# Patient Record
Sex: FEMALE | Race: WHITE | NOT HISPANIC OR LATINO | Employment: UNEMPLOYED | URBAN - NONMETROPOLITAN AREA
[De-identification: names, ages, dates, MRNs, and addresses within clinical notes are randomized per-mention and may not be internally consistent; named-entity substitution may affect disease eponyms.]

---

## 2018-01-29 ENCOUNTER — TRANSCRIBE ORDERS (OUTPATIENT)
Dept: ADMINISTRATIVE | Facility: HOSPITAL | Age: 43
End: 2018-01-29

## 2018-01-29 DIAGNOSIS — Z12.31 VISIT FOR SCREENING MAMMOGRAM: Primary | ICD-10-CM

## 2018-02-13 ENCOUNTER — HOSPITAL ENCOUNTER (OUTPATIENT)
Dept: MAMMOGRAPHY | Facility: HOSPITAL | Age: 43
Discharge: HOME | End: 2018-02-13
Admitting: NURSE PRACTITIONER

## 2018-02-13 DIAGNOSIS — Z12.31 VISIT FOR SCREENING MAMMOGRAM: ICD-10-CM

## 2018-02-13 PROCEDURE — 77067 SCR MAMMO BI INCL CAD: CPT

## 2018-02-13 PROCEDURE — 77067 SCR MAMMO BI INCL CAD: CPT | Performed by: RADIOLOGY

## 2018-02-13 PROCEDURE — 77063 BREAST TOMOSYNTHESIS BI: CPT

## 2018-02-13 PROCEDURE — 77063 BREAST TOMOSYNTHESIS BI: CPT | Performed by: RADIOLOGY

## 2019-07-10 ENCOUNTER — HOSPITAL ENCOUNTER (OUTPATIENT)
Dept: MAMMOGRAPHY | Facility: HOSPITAL | Age: 44
Discharge: HOME | End: 2019-07-10
Admitting: NURSE PRACTITIONER

## 2019-07-10 DIAGNOSIS — Z12.39 SCREENING BREAST EXAMINATION: ICD-10-CM

## 2019-07-10 PROCEDURE — 77063 BREAST TOMOSYNTHESIS BI: CPT

## 2019-07-10 PROCEDURE — 77067 SCR MAMMO BI INCL CAD: CPT | Performed by: RADIOLOGY

## 2019-07-10 PROCEDURE — 77063 BREAST TOMOSYNTHESIS BI: CPT | Performed by: RADIOLOGY

## 2019-07-10 PROCEDURE — 77067 SCR MAMMO BI INCL CAD: CPT

## 2019-07-25 ENCOUNTER — HOSPITAL ENCOUNTER (OUTPATIENT)
Dept: ULTRASOUND IMAGING | Facility: HOSPITAL | Age: 44
Discharge: HOME | End: 2019-07-25
Admitting: RADIOLOGY

## 2019-07-25 DIAGNOSIS — R92.8 ABNORMAL MAMMOGRAM OF BOTH BREASTS: ICD-10-CM

## 2019-07-25 PROCEDURE — 76642 ULTRASOUND BREAST LIMITED: CPT

## 2019-07-25 PROCEDURE — 76642 ULTRASOUND BREAST LIMITED: CPT | Performed by: RADIOLOGY

## 2019-08-28 ENCOUNTER — TRANSCRIBE ORDERS (OUTPATIENT)
Dept: ADMINISTRATIVE | Facility: HOSPITAL | Age: 44
End: 2019-08-28

## 2019-08-28 ENCOUNTER — HOSPITAL ENCOUNTER (OUTPATIENT)
Dept: GENERAL RADIOLOGY | Facility: HOSPITAL | Age: 44
Discharge: HOME | End: 2019-08-28
Admitting: NURSE PRACTITIONER

## 2019-08-28 DIAGNOSIS — R52 PAIN: ICD-10-CM

## 2019-08-28 DIAGNOSIS — R52 PAIN: Primary | ICD-10-CM

## 2019-08-28 PROCEDURE — 72110 X-RAY EXAM L-2 SPINE 4/>VWS: CPT

## 2019-08-28 PROCEDURE — 72220 X-RAY EXAM SACRUM TAILBONE: CPT | Performed by: RADIOLOGY

## 2019-08-28 PROCEDURE — 72220 X-RAY EXAM SACRUM TAILBONE: CPT

## 2019-08-28 PROCEDURE — 72110 X-RAY EXAM L-2 SPINE 4/>VWS: CPT | Performed by: RADIOLOGY

## 2020-03-11 ENCOUNTER — HOSPITAL ENCOUNTER (OUTPATIENT)
Dept: MAMMOGRAPHY | Facility: HOSPITAL | Age: 45
Discharge: HOME | End: 2020-03-11
Admitting: RADIOLOGY

## 2020-03-11 DIAGNOSIS — Z09 FOLLOW-UP EXAM, 3-6 MONTHS SINCE PREVIOUS EXAM: ICD-10-CM

## 2020-03-11 PROCEDURE — 77066 DX MAMMO INCL CAD BI: CPT | Performed by: RADIOLOGY

## 2020-03-11 PROCEDURE — G0279 TOMOSYNTHESIS, MAMMO: HCPCS

## 2020-03-11 PROCEDURE — 77066 DX MAMMO INCL CAD BI: CPT

## 2020-03-11 PROCEDURE — 77062 BREAST TOMOSYNTHESIS BI: CPT | Performed by: RADIOLOGY

## 2020-05-06 ENCOUNTER — TRANSCRIBE ORDERS (OUTPATIENT)
Dept: ADMINISTRATIVE | Facility: HOSPITAL | Age: 45
End: 2020-05-06

## 2020-05-06 DIAGNOSIS — R22.1 NECK MASS: Primary | ICD-10-CM

## 2020-05-06 DIAGNOSIS — R13.14 DYSPHAGIA, PHARYNGOESOPHAGEAL PHASE: ICD-10-CM

## 2020-05-19 ENCOUNTER — HOSPITAL ENCOUNTER (OUTPATIENT)
Dept: CT IMAGING | Facility: HOSPITAL | Age: 45
Discharge: HOME | End: 2020-05-19
Admitting: OTOLARYNGOLOGY

## 2020-05-19 DIAGNOSIS — R22.1 NECK MASS: ICD-10-CM

## 2020-05-19 DIAGNOSIS — R13.14 DYSPHAGIA, PHARYNGOESOPHAGEAL PHASE: ICD-10-CM

## 2020-05-19 PROCEDURE — 0 IOVERSOL 68 % SOLUTION: Performed by: OTOLARYNGOLOGY

## 2020-05-19 PROCEDURE — 70491 CT SOFT TISSUE NECK W/DYE: CPT | Performed by: RADIOLOGY

## 2020-05-19 PROCEDURE — 70491 CT SOFT TISSUE NECK W/DYE: CPT

## 2020-05-19 RX ADMIN — IOVERSOL 50 ML: 678 INJECTION INTRA-ARTERIAL; INTRAVENOUS at 09:22

## 2020-06-03 ENCOUNTER — TRANSCRIBE ORDERS (OUTPATIENT)
Dept: ADMINISTRATIVE | Facility: HOSPITAL | Age: 45
End: 2020-06-03

## 2020-06-03 DIAGNOSIS — R13.14 DYSPHAGIA, PHARYNGOESOPHAGEAL PHASE: Primary | ICD-10-CM

## 2020-06-12 ENCOUNTER — CONSULT (OUTPATIENT)
Dept: GASTROENTEROLOGY | Facility: CLINIC | Age: 45
End: 2020-06-12

## 2020-06-12 VITALS
TEMPERATURE: 98.4 F | SYSTOLIC BLOOD PRESSURE: 115 MMHG | HEART RATE: 72 BPM | WEIGHT: 206.4 LBS | DIASTOLIC BLOOD PRESSURE: 79 MMHG | OXYGEN SATURATION: 97 % | HEIGHT: 66 IN | BODY MASS INDEX: 33.17 KG/M2

## 2020-06-12 DIAGNOSIS — R13.19 ESOPHAGEAL DYSPHAGIA: Primary | ICD-10-CM

## 2020-06-12 DIAGNOSIS — R11.2 NAUSEA AND VOMITING, INTRACTABILITY OF VOMITING NOT SPECIFIED, UNSPECIFIED VOMITING TYPE: ICD-10-CM

## 2020-06-12 PROCEDURE — 99243 OFF/OP CNSLTJ NEW/EST LOW 30: CPT | Performed by: PHYSICIAN ASSISTANT

## 2020-06-12 RX ORDER — IBUPROFEN 800 MG/1
800 TABLET ORAL EVERY 6 HOURS PRN
COMMUNITY
End: 2022-09-16

## 2020-06-12 RX ORDER — TOPIRAMATE 25 MG/1
25 TABLET ORAL 2 TIMES DAILY
COMMUNITY
Start: 2020-03-25 | End: 2022-09-16

## 2020-06-12 RX ORDER — CYANOCOBALAMIN 1000 UG/ML
INJECTION, SOLUTION INTRAMUSCULAR; SUBCUTANEOUS
COMMUNITY
Start: 2020-05-08

## 2020-06-12 RX ORDER — GABAPENTIN 800 MG/1
800 TABLET ORAL 3 TIMES DAILY
COMMUNITY
Start: 2020-05-24

## 2020-06-12 RX ORDER — SUMATRIPTAN 100 MG/1
TABLET, FILM COATED ORAL
COMMUNITY
Start: 2020-03-25 | End: 2022-09-16

## 2020-06-12 RX ORDER — TRAZODONE HYDROCHLORIDE 150 MG/1
150 TABLET ORAL NIGHTLY PRN
COMMUNITY
Start: 2020-04-08 | End: 2022-09-16

## 2020-06-12 RX ORDER — ESCITALOPRAM OXALATE 10 MG/1
10 TABLET ORAL EVERY MORNING
Status: ON HOLD | COMMUNITY
Start: 2020-03-31 | End: 2022-09-19

## 2020-06-12 NOTE — PROGRESS NOTES
: 1975    Chief Complaint   Patient presents with   • Difficulty Swallowing       Marisol Overton is a 44 y.o. female who presents to the office today as a consultation from Tyrese Mcneill MD for evaluation of Difficulty Swallowing.    History of Present Illness:  For the past 8 years, she has been having dysphagia with eating. She has noticed it is worse with meats and fruits. She was seeing ENT due to swelling in her neck that turned out to be benign. She has a cough which she relates as well to the dysphagia. She will regurgitate her food when it will not go down. Denies heartburn or acid reflux into her mouth. Does wake up with a sore throat in the mornings at times. Has nausea and vomiting which is mild and intermittent. Appetite is ok, taking Topamax for migraines. Has constipation which is mild and intermittent, usually she has bowel movements daily. Denies any rectal bleeding. Mother had colon polyps. There is no known family history of colon cancer or colon polyps.    Was told in the past that she could have a hiatal hernia.    Review of Systems   Constitutional: Negative for chills, fatigue and fever.   HENT: Positive for trouble swallowing.    Eyes: Negative.    Respiratory: Positive for cough and choking. Negative for chest tightness and shortness of breath.    Cardiovascular: Negative for chest pain.   Gastrointestinal: Positive for constipation. Negative for abdominal distention, abdominal pain, anal bleeding, blood in stool, diarrhea, nausea and vomiting.   Endocrine: Negative.    Genitourinary: Negative for difficulty urinating.   Musculoskeletal: Positive for back pain. Negative for neck pain.   Skin: Negative.    Allergic/Immunologic: Negative for environmental allergies and food allergies.   Neurological: Positive for dizziness, light-headedness and headaches.   Hematological: Negative.    Psychiatric/Behavioral: Negative.      I have reviewed and confirmed the accuracy of the ROS as  "documented by the MA/LPN/RN Ambika Bacon PA-C    Past Medical History:   Diagnosis Date   • Ovarian cancer (CMS/HCC) 1997       Past Surgical History:   Procedure Laterality Date   • APPENDECTOMY     • CHOLECYSTECTOMY         Family History   Problem Relation Age of Onset   • Breast cancer Paternal Grandmother    • Lung cancer Maternal Grandmother        Social History     Socioeconomic History   • Marital status:      Spouse name: Not on file   • Number of children: Not on file   • Years of education: Not on file   • Highest education level: Not on file   Tobacco Use   • Smoking status: Never Smoker   • Smokeless tobacco: Never Used   Substance and Sexual Activity   • Alcohol use: Never     Frequency: Never   • Drug use: Never   • Sexual activity: Defer     Current Outpatient Medications:   •  cyanocobalamin 1000 MCG/ML injection, INJECT 1 ML INTRAMUSCULARLY EVERY TWO WEEKS, Disp: , Rfl:   •  escitalopram (LEXAPRO) 10 MG tablet, Take 10 mg by mouth Every Morning., Disp: , Rfl:   •  gabapentin (NEURONTIN) 800 MG tablet, Take 800 mg by mouth 3 (Three) Times a Day., Disp: , Rfl:   •  ibuprofen (ADVIL,MOTRIN) 800 MG tablet, Take 800 mg by mouth Every 6 (Six) Hours As Needed for Mild Pain ., Disp: , Rfl:   •  SUMAtriptan (IMITREX) 100 MG tablet, TAKE 1 TABLET BY MOUTH ONCE DAILY AS NEEDED FOR MIGRAINE HEADACHE, Disp: , Rfl:   •  topiramate (TOPAMAX) 25 MG tablet, Take 25 mg by mouth 2 (Two) Times a Day., Disp: , Rfl:   •  traZODone (DESYREL) 150 MG tablet, Take 150 mg by mouth At Night As Needed. for sleep, Disp: , Rfl:     Allergies:   Shellfish-derived products    Vitals:  /79 (BP Location: Left arm, Patient Position: Sitting, Cuff Size: Adult)   Pulse 72   Temp 98.4 °F (36.9 °C)   Ht 167.6 cm (66\")   Wt 93.6 kg (206 lb 6.4 oz)   SpO2 97%   BMI 33.31 kg/m²     Physical Exam   Constitutional: She is oriented to person, place, and time. She appears well-developed and well-nourished. No distress. "   HENT:   Head: Normocephalic and atraumatic.   Wearing mask covering mouth only   Eyes: Conjunctivae are normal. Right eye exhibits no discharge. Left eye exhibits no discharge. No scleral icterus.   Neck: Normal range of motion. No JVD present.   Cardiovascular: Normal rate, regular rhythm and normal heart sounds. Exam reveals no gallop and no friction rub.   No murmur heard.  Pulmonary/Chest: Effort normal and breath sounds normal. No respiratory distress. She has no wheezes. She has no rales. She exhibits no tenderness.   Abdominal: Soft. Bowel sounds are normal. She exhibits no mass. There is no tenderness.   Musculoskeletal: Normal range of motion. She exhibits no edema or deformity.   Neurological: She is alert and oriented to person, place, and time. Coordination normal.   Skin: Skin is warm and dry. No rash noted. She is not diaphoretic. No erythema.   Tan skin. Tattoos.   Psychiatric: She has a normal mood and affect. Her behavior is normal. Judgment and thought content normal.   Vitals reviewed.    Assessment:  1. Esophageal dysphagia    2. Nausea and vomiting, intractability of vomiting not specified, unspecified vomiting type      Plan:  Orders Placed This Encounter   Procedures   • Follow Anesthesia Guidelines / Standing Orders   • Obtain Informed Consent     ESOPHAGOGASTRODUODENOSCOPY WITH DILATATION CPT CODE: 52632 (N/A)  She will need an esophagogastroduodenoscopy with possible dilatation of the esophagus performed with IV general sedation. All of the risks, benefits and alternatives of this procedure have been discussed with her, all of her questions have been answered and she has elected to proceed. She should follow up in the office after this procedure to discuss the results and further recommendations can be made at that time.          Return for follow up after procedure to discuss results.      Electronically signed 6/12/2020 08:59  Ambika Bacon PA-C, [facility]

## 2020-06-29 PROBLEM — R13.19 ESOPHAGEAL DYSPHAGIA: Status: ACTIVE | Noted: 2020-06-29

## 2020-07-02 ENCOUNTER — TRANSCRIBE ORDERS (OUTPATIENT)
Dept: ADMINISTRATIVE | Facility: HOSPITAL | Age: 45
End: 2020-07-02

## 2020-07-02 DIAGNOSIS — Z01.818 OTHER SPECIFIED PRE-OPERATIVE EXAMINATION: Primary | ICD-10-CM

## 2020-07-03 ENCOUNTER — LAB (OUTPATIENT)
Dept: LAB | Facility: HOSPITAL | Age: 45
End: 2020-07-03

## 2020-07-03 DIAGNOSIS — Z01.818 OTHER SPECIFIED PRE-OPERATIVE EXAMINATION: ICD-10-CM

## 2020-07-03 LAB
REF LAB TEST METHOD: NORMAL
SARS-COV-2 RNA RESP QL NAA+PROBE: NOT DETECTED

## 2020-07-03 PROCEDURE — U0002 COVID-19 LAB TEST NON-CDC: HCPCS

## 2020-07-03 PROCEDURE — U0004 COV-19 TEST NON-CDC HGH THRU: HCPCS

## 2020-07-03 PROCEDURE — C9803 HOPD COVID-19 SPEC COLLECT: HCPCS

## 2020-07-06 ENCOUNTER — ANESTHESIA (OUTPATIENT)
Dept: PERIOP | Facility: HOSPITAL | Age: 45
End: 2020-07-06

## 2020-07-06 ENCOUNTER — ANESTHESIA EVENT (OUTPATIENT)
Dept: PERIOP | Facility: HOSPITAL | Age: 45
End: 2020-07-06

## 2020-07-06 ENCOUNTER — HOSPITAL ENCOUNTER (OUTPATIENT)
Facility: HOSPITAL | Age: 45
Setting detail: HOSPITAL OUTPATIENT SURGERY
Discharge: HOME | End: 2020-07-06
Attending: INTERNAL MEDICINE | Admitting: INTERNAL MEDICINE

## 2020-07-06 VITALS
HEART RATE: 68 BPM | WEIGHT: 207 LBS | SYSTOLIC BLOOD PRESSURE: 110 MMHG | TEMPERATURE: 98.3 F | DIASTOLIC BLOOD PRESSURE: 71 MMHG | OXYGEN SATURATION: 98 % | HEIGHT: 66 IN | RESPIRATION RATE: 18 BRPM | BODY MASS INDEX: 33.27 KG/M2

## 2020-07-06 DIAGNOSIS — R13.19 ESOPHAGEAL DYSPHAGIA: ICD-10-CM

## 2020-07-06 LAB
B-HCG UR QL: NEGATIVE
INTERNAL NEGATIVE CONTROL: NEGATIVE
INTERNAL POSITIVE CONTROL: POSITIVE
Lab: NORMAL

## 2020-07-06 PROCEDURE — 81025 URINE PREGNANCY TEST: CPT | Performed by: ANESTHESIOLOGY

## 2020-07-06 PROCEDURE — 43239 EGD BIOPSY SINGLE/MULTIPLE: CPT | Performed by: INTERNAL MEDICINE

## 2020-07-06 PROCEDURE — 43249 ESOPH EGD DILATION <30 MM: CPT | Performed by: INTERNAL MEDICINE

## 2020-07-06 PROCEDURE — 25010000002 MIDAZOLAM PER 1 MG: Performed by: NURSE ANESTHETIST, CERTIFIED REGISTERED

## 2020-07-06 PROCEDURE — 25010000002 PROPOFOL 10 MG/ML EMULSION: Performed by: NURSE ANESTHETIST, CERTIFIED REGISTERED

## 2020-07-06 PROCEDURE — 25010000002 FENTANYL CITRATE (PF) 100 MCG/2ML SOLUTION: Performed by: NURSE ANESTHETIST, CERTIFIED REGISTERED

## 2020-07-06 RX ORDER — FENTANYL CITRATE 50 UG/ML
50 INJECTION, SOLUTION INTRAMUSCULAR; INTRAVENOUS
Status: DISCONTINUED | OUTPATIENT
Start: 2020-07-06 | End: 2020-07-06 | Stop reason: HOSPADM

## 2020-07-06 RX ORDER — PROPOFOL 10 MG/ML
VIAL (ML) INTRAVENOUS AS NEEDED
Status: DISCONTINUED | OUTPATIENT
Start: 2020-07-06 | End: 2020-07-06 | Stop reason: SURG

## 2020-07-06 RX ORDER — ONDANSETRON 2 MG/ML
4 INJECTION INTRAMUSCULAR; INTRAVENOUS AS NEEDED
Status: DISCONTINUED | OUTPATIENT
Start: 2020-07-06 | End: 2020-07-06 | Stop reason: HOSPADM

## 2020-07-06 RX ORDER — FENTANYL CITRATE 50 UG/ML
INJECTION, SOLUTION INTRAMUSCULAR; INTRAVENOUS AS NEEDED
Status: DISCONTINUED | OUTPATIENT
Start: 2020-07-06 | End: 2020-07-06 | Stop reason: SURG

## 2020-07-06 RX ORDER — MIDAZOLAM HYDROCHLORIDE 1 MG/ML
1 INJECTION INTRAMUSCULAR; INTRAVENOUS
Status: DISCONTINUED | OUTPATIENT
Start: 2020-07-06 | End: 2020-07-06 | Stop reason: HOSPADM

## 2020-07-06 RX ORDER — SODIUM CHLORIDE 0.9 % (FLUSH) 0.9 %
10 SYRINGE (ML) INJECTION AS NEEDED
Status: DISCONTINUED | OUTPATIENT
Start: 2020-07-06 | End: 2020-07-06 | Stop reason: HOSPADM

## 2020-07-06 RX ORDER — SODIUM CHLORIDE 0.9 % (FLUSH) 0.9 %
10 SYRINGE (ML) INJECTION EVERY 12 HOURS SCHEDULED
Status: DISCONTINUED | OUTPATIENT
Start: 2020-07-06 | End: 2020-07-06 | Stop reason: HOSPADM

## 2020-07-06 RX ORDER — OXYCODONE HYDROCHLORIDE AND ACETAMINOPHEN 5; 325 MG/1; MG/1
1 TABLET ORAL ONCE AS NEEDED
Status: DISCONTINUED | OUTPATIENT
Start: 2020-07-06 | End: 2020-07-06 | Stop reason: HOSPADM

## 2020-07-06 RX ORDER — MEPERIDINE HYDROCHLORIDE 25 MG/ML
12.5 INJECTION INTRAMUSCULAR; INTRAVENOUS; SUBCUTANEOUS
Status: DISCONTINUED | OUTPATIENT
Start: 2020-07-06 | End: 2020-07-06 | Stop reason: HOSPADM

## 2020-07-06 RX ORDER — SODIUM CHLORIDE, SODIUM LACTATE, POTASSIUM CHLORIDE, CALCIUM CHLORIDE 600; 310; 30; 20 MG/100ML; MG/100ML; MG/100ML; MG/100ML
125 INJECTION, SOLUTION INTRAVENOUS CONTINUOUS
Status: DISCONTINUED | OUTPATIENT
Start: 2020-07-06 | End: 2020-07-06 | Stop reason: HOSPADM

## 2020-07-06 RX ORDER — MIDAZOLAM HYDROCHLORIDE 1 MG/ML
INJECTION INTRAMUSCULAR; INTRAVENOUS AS NEEDED
Status: DISCONTINUED | OUTPATIENT
Start: 2020-07-06 | End: 2020-07-06 | Stop reason: SURG

## 2020-07-06 RX ORDER — IPRATROPIUM BROMIDE AND ALBUTEROL SULFATE 2.5; .5 MG/3ML; MG/3ML
3 SOLUTION RESPIRATORY (INHALATION) ONCE AS NEEDED
Status: DISCONTINUED | OUTPATIENT
Start: 2020-07-06 | End: 2020-07-06 | Stop reason: HOSPADM

## 2020-07-06 RX ADMIN — MIDAZOLAM HYDROCHLORIDE 2 MG: 1 INJECTION, SOLUTION INTRAMUSCULAR; INTRAVENOUS at 12:18

## 2020-07-06 RX ADMIN — FENTANYL CITRATE 100 MCG: 50 INJECTION INTRAMUSCULAR; INTRAVENOUS at 12:18

## 2020-07-06 RX ADMIN — PROPOFOL 40 MG: 10 INJECTION, EMULSION INTRAVENOUS at 12:20

## 2020-07-06 RX ADMIN — PROPOFOL 180 MCG/KG/MIN: 10 INJECTION, EMULSION INTRAVENOUS at 12:20

## 2020-07-06 RX ADMIN — SODIUM CHLORIDE, POTASSIUM CHLORIDE, SODIUM LACTATE AND CALCIUM CHLORIDE 125 ML/HR: 600; 310; 30; 20 INJECTION, SOLUTION INTRAVENOUS at 11:18

## 2020-07-06 NOTE — ANESTHESIA POSTPROCEDURE EVALUATION
Patient: Marisol Overton    Procedure Summary     Date:  07/06/20 Room / Location:   [facility] 07 /  [facility]    Anesthesia Start:  1218 Anesthesia Stop:  1234    Procedure:  ESOPHAGOGASTRODUODENOSCOPY WITH DILATATION CPT CODE: 09656 (N/A Esophagus) Diagnosis:       Esophageal dysphagia      (Esophageal dysphagia [R13.10])    Surgeon:  Jose Roberto Howard MD Provider:  Yousif Espana MD    Anesthesia Type:  general ASA Status:  2          Anesthesia Type: general    Vitals  Vitals Value Taken Time   /71 7/6/2020  1:02 PM   Temp 98.3 °F (36.8 °C) 7/6/2020 12:37 PM   Pulse 68 7/6/2020  1:02 PM   Resp 18 7/6/2020  1:02 PM   SpO2 98 % 7/6/2020  1:02 PM           Post Anesthesia Care and Evaluation    Patient location during evaluation: PHASE II  Patient participation: complete - patient participated  Level of consciousness: awake and alert  Pain score: 0  Pain management: adequate  Airway patency: patent  Anesthetic complications: No anesthetic complications  PONV Status: controlled  Cardiovascular status: acceptable  Respiratory status: acceptable and room air  Hydration status: euvolemic  No anesthesia care post op

## 2020-07-06 NOTE — ANESTHESIA PREPROCEDURE EVALUATION
Anesthesia Evaluation     history of anesthetic complications: PONV  NPO Solid Status: > 8 hours             Airway   Mallampati: II  TM distance: >3 FB  Neck ROM: full  No difficulty expected  Dental - normal exam     Pulmonary - normal exam   (+) asthma,  Cardiovascular - normal exam    (+) hyperlipidemia,       Neuro/Psych  GI/Hepatic/Renal/Endo      Musculoskeletal     Abdominal  - normal exam   Substance History      OB/GYN          Other   arthritis,    history of cancer remission                    Anesthesia Plan    ASA 2     general     intravenous induction     Anesthetic plan, all risks, benefits, and alternatives have been provided, discussed and informed consent has been obtained with: patient.

## 2020-07-06 NOTE — OP NOTE
ESOPHAGOGASTRODUODENOSCOPY PROCEDURE REPORT    Marisol Overton  7/6/2020    GASTROENTEROLOGIST:  Jose Roberto Howard MD    PRE-PROCEDURE DIAGNOSIS:  Esophageal dysphagia [R13.10]    POST-PROCEDURE DIAGNOSIS:  1.-Distal esophageal ring biopsied to disrupt and dilated to 20 mm with a through-the-scope balloon  2.-  Suspected short segment Hoyos's esophagus and distal 1 cm esophagus above the top of gastric folds.  Pathology pending for confirmation  3.-  Small sliding hiatal hernia    INDICATION:  Dysphagia    Procedure(s):  EGD with biopsy and dilation, esophageal, with 20 mm balloon    ANESTHESIA:  Propofol administered by anesthesia.  See anesthesia notes for ASA classification    STAFF:  Circulator: Birdie Overton RN  Relief Circulator: Femi Devries RN  Endo Technician: Flaquita Leon    FINDINGS:  As noted in the post procedure diagnosis    OPERATIVE PROCEDURE:  After proper informed consent was obtained, patient was transferred to the OR/endoscopy suite.  Patient was then placed in left lateral decubitus position. The Olympus 180 series video gastroscope was inserted orally under direct visualization.  Esophagus, stomach, and duodenum were inspected.  The endoscope was passed to the third portion of the duodenum.  Scope was retroflexed for visualization of the cardia and incisuraThe endoscope was then withdrawn. Patient tolerated the procedure well. There were no immediate complications.    ESTIMATED BLOOD LOSS:  None    SPECIMENS:  1.-Esophageal body biopsies pending to rule out eosinophilia  2.-Distal esophageal biopsies pending to rule out short segment Hoyos's esophagus               COMPLICATIONS;  None    RECOMMENDATIONS/ PLAN:  1.-  Await pathology report  2.-  Redilate as needed  3.-  GI clinic follow-up    Jose Roberto Howard MD     07/06/20 12:30 PM

## 2020-07-08 LAB
LAB AP CASE REPORT: NORMAL
PATH REPORT.FINAL DX SPEC: NORMAL

## 2020-07-28 ENCOUNTER — OFFICE VISIT (OUTPATIENT)
Dept: GASTROENTEROLOGY | Facility: CLINIC | Age: 45
End: 2020-07-28

## 2020-07-28 VITALS
HEART RATE: 70 BPM | BODY MASS INDEX: 33.23 KG/M2 | WEIGHT: 206.8 LBS | SYSTOLIC BLOOD PRESSURE: 115 MMHG | OXYGEN SATURATION: 97 % | HEIGHT: 66 IN | TEMPERATURE: 98.3 F | DIASTOLIC BLOOD PRESSURE: 77 MMHG

## 2020-07-28 DIAGNOSIS — K59.00 CONSTIPATION, UNSPECIFIED CONSTIPATION TYPE: ICD-10-CM

## 2020-07-28 DIAGNOSIS — K21.9 GASTROESOPHAGEAL REFLUX DISEASE WITHOUT ESOPHAGITIS: ICD-10-CM

## 2020-07-28 DIAGNOSIS — R13.19 ESOPHAGEAL DYSPHAGIA: Primary | ICD-10-CM

## 2020-07-28 DIAGNOSIS — R11.0 NAUSEA: ICD-10-CM

## 2020-07-28 PROCEDURE — 99214 OFFICE O/P EST MOD 30 MIN: CPT | Performed by: PHYSICIAN ASSISTANT

## 2020-07-28 RX ORDER — BUSPIRONE HYDROCHLORIDE 10 MG/1
10 TABLET ORAL 3 TIMES DAILY
COMMUNITY
End: 2022-09-16

## 2020-07-28 RX ORDER — OMEPRAZOLE 40 MG/1
40 CAPSULE, DELAYED RELEASE ORAL DAILY
Qty: 30 CAPSULE | Refills: 5 | Status: SHIPPED | OUTPATIENT
Start: 2020-07-28 | End: 2021-12-03

## 2020-07-28 NOTE — PROGRESS NOTES
: 1975    Chief Complaint   Patient presents with   • recent EGD   • Abdominal Pain       Marisol Overton is a 44 y.o. female who presents to the office today as a follow up appointment regarding recent EGD and Abdominal Pain.    History of Present Illness:  She would like to discuss her recent EGD results. She has noticed an improvement in dysphagia since her dilatation. She only has intermittent trouble with swallowing meats but generally avoids them. Denies significant heartburn or reflux into her mouth. Is not currently taking any acid reflux medications. She has been having bloating, nausea and generalized abdominal discomfort since her last visit. Reports that bowel habits seem to fluctuate with loose stools and hard stools along with skip days between BMs. Denies any rectal bleeding. Mother had colon polyps. There is no known family history of colon cancer or colon polyps.    Review of Systems   Constitutional: Negative for chills, fatigue and fever.   HENT: Negative for trouble swallowing.    Eyes: Negative.    Respiratory: Positive for cough and choking. Negative for chest tightness and shortness of breath.    Cardiovascular: Negative for chest pain.   Gastrointestinal: Positive for abdominal distention, abdominal pain, constipation and diarrhea. Negative for anal bleeding, blood in stool, nausea and vomiting.   Endocrine: Negative.    Genitourinary: Negative for difficulty urinating.   Musculoskeletal: Positive for back pain. Negative for neck pain.   Skin: Negative.    Allergic/Immunologic: Negative for environmental allergies and food allergies.   Neurological: Positive for dizziness, light-headedness and headaches.   Hematological: Negative.    Psychiatric/Behavioral: Negative.      I have reviewed and confirmed the accuracy of the ROS as documented by the MA/LPN/RN Ambika Bacon PA-C    Past Medical History:   Diagnosis Date   • Arthritis    • Asthma    • Elevated cholesterol    • Ovarian  cancer (CMS/HCC)    • PONV (postoperative nausea and vomiting)        Past Surgical History:   Procedure Laterality Date   • APPENDECTOMY     •  SECTION      X 4   • CHOLECYSTECTOMY     • CYST REMOVAL Left     NECK   • ENDOSCOPY N/A 2020    Procedure: ESOPHAGOGASTRODUODENOSCOPY WITH DILATATION CPT CODE: 32257;  Surgeon: Jose Roberto Howard MD;  [facility];  Service: Gastroenterology;  Laterality: N/A;   • OOPHORECTOMY      UNKNOWN PER PT       Family History   Problem Relation Age of Onset   • Breast cancer Paternal Grandmother    • Lung cancer Maternal Grandmother        Social History     Socioeconomic History   • Marital status:      Spouse name: Not on file   • Number of children: Not on file   • Years of education: Not on file   • Highest education level: Not on file   Tobacco Use   • Smoking status: Never Smoker   • Smokeless tobacco: Never Used   Substance and Sexual Activity   • Alcohol use: Never     Frequency: Never   • Drug use: Never   • Sexual activity: Defer       Current Outpatient Medications:   •  busPIRone (BUSPAR) 10 MG tablet, Take 10 mg by mouth 3 (Three) Times a Day., Disp: , Rfl:   •  cyanocobalamin 1000 MCG/ML injection, INJECT 1 ML INTRAMUSCULARLY EVERY TWO WEEKS, Disp: , Rfl:   •  escitalopram (LEXAPRO) 10 MG tablet, Take 10 mg by mouth Every Morning., Disp: , Rfl:   •  gabapentin (NEURONTIN) 800 MG tablet, Take 800 mg by mouth 3 (Three) Times a Day., Disp: , Rfl:   •  ibuprofen (ADVIL,MOTRIN) 800 MG tablet, Take 800 mg by mouth Every 6 (Six) Hours As Needed for Mild Pain ., Disp: , Rfl:   •  SUMAtriptan (IMITREX) 100 MG tablet, TAKE 1 TABLET BY MOUTH ONCE DAILY AS NEEDED FOR MIGRAINE HEADACHE, Disp: , Rfl:   •  topiramate (TOPAMAX) 25 MG tablet, Take 25 mg by mouth 2 (Two) Times a Day., Disp: , Rfl:   •  traZODone (DESYREL) 150 MG tablet, Take 150 mg by mouth At Night As Needed. for sleep, Disp: , Rfl:     Allergies:   Shellfish-derived products;  "Other; and Watermelon flavor    Vitals:  /77 (BP Location: Left arm, Patient Position: Sitting, Cuff Size: Adult)   Pulse 70   Temp 98.3 °F (36.8 °C)   Ht 167.6 cm (66\")   Wt 93.8 kg (206 lb 12.8 oz)   SpO2 97%   BMI 33.38 kg/m²     Physical Exam   Constitutional: She is oriented to person, place, and time. She appears well-developed and well-nourished. No distress.   HENT:   Head: Normocephalic and atraumatic.   Right Ear: External ear normal.   Left Ear: External ear normal.   Wearing a mask   Eyes: Conjunctivae are normal. Right eye exhibits no discharge. Left eye exhibits no discharge. No scleral icterus.   Neck: Normal range of motion. No JVD present.   Cardiovascular: Normal rate, regular rhythm and normal heart sounds. Exam reveals no gallop and no friction rub.   No murmur heard.  Pulmonary/Chest: Effort normal and breath sounds normal. No respiratory distress. She has no wheezes. She has no rales. She exhibits no tenderness.   Abdominal: Soft. Bowel sounds are normal. She exhibits no mass. There is tenderness (generalized, mild).   Musculoskeletal: Normal range of motion. She exhibits no edema or deformity.   Neurological: She is alert and oriented to person, place, and time. Coordination normal.   Skin: Skin is warm and dry. No rash noted. She is not diaphoretic. No erythema.   Piercings and tattoos   Psychiatric: She has a normal mood and affect. Her behavior is normal. Judgment and thought content normal.   Vitals reviewed.    Results Review:  EGD was completed on 7/6/2020 by Dr. Howard. Findings were:  1.-Distal esophageal ring biopsied to disrupt and dilated to 20 mm with a through-the-scope balloon  2.-  Suspected short segment Hoyos's esophagus and distal 1 cm esophagus above the top of gastric folds.  Pathology pending for confirmation  3.-  Small sliding hiatal hernia  Pathology showed reflux with inflammation of the distal esophagus but otherwise benign.    Assessment:  1. " Esophageal dysphagia    2. Gastroesophageal reflux disease without esophagitis    3. Nausea    4. Constipation, unspecified constipation type      Plan:  Dysphagia has improved, she will continue to monitor and I suspect that it will resolve with GERD control. She was instructed not to lie down immediately after eating (wait at least 3 hours after meals), elevate the head of the bed at night, avoid spicy foods, avoid mints, avoid caffeine, avoid nicotine and work on getting to a healthy weight. She will start taking omeprazole 30 mg once daily 30 minutes before a meal for treatment of GERD.     I suspect that her constipation which is contributing to her nausea and generalized abdominal tenderness, she will drink 1 bottle magnesium citrate for partial bowel cleanse and monitor stools after. She was instructed to increase dietary fiber intake to 25-45g daily and a list of fiber foods was given. She has agreed to try to increase daily water intake and daily exercise as well.     New Medications Ordered This Visit   Medications   • omeprazole (priLOSEC) 40 MG capsule     Sig: Take 1 capsule by mouth Daily.     Dispense:  30 capsule     Refill:  5   • magnesium citrate solution     Sig: Take 296 mL by mouth 1 (One) Time for 1 dose. For bowel cleanse     Dispense:  296 mL     Refill:  0           Return in about 1 month (around 8/28/2020) for recheck constipation.      Electronically signed 7/28/2020 15:40  Ambika Bacon PA-C, [facility]

## 2020-07-28 NOTE — PATIENT INSTRUCTIONS
Drink 1 bottle magnesium citrate for bowel cleanse. Monitor for improvement after. Increase dietary fiber, need 100 oz water daily, 30 mins exercise 3-4 times per week.     Fiber Foods  It is recommended that you consume 25-45 grams daily.    Fresh & Dried Fruit  Serving Size Fiber (g)    Apples with skin  1 medium 5.0    Apricot  3 medium 1.0    Apricots, dried  4 pieces 2.9    Banana  1 medium 3.9    Blueberries  1 cup 4.2    Cantaloupe, cubes  1 cup 1.3    Figs, dried  2 medium 3.7    Grapefruit  1/2 medium 3.1    Orange, navel  1 medium 3.4    Peach  1 medium 2.0    Peaches, dried  3 pieces 3.2    Pear  1 medium 5.1    Plum  1 medium 1.1    Raisins  1.5 oz box 1.6    Raspberries  1 cup 6.4    Strawberries  1 cup 4.4      Grains, Beans, Nuts & Seeds  Serving Size Fiber (g)    Almonds  1 oz 4.2    Black beans, cooked  1 cup 13.9    Bran cereal  1 cup 19.9    Bread, whole wheat  1 slice 2.0    Brown rice, dry  1 cup 7.9    Cashews  1 oz 1.0    Flax seeds  3 Tbsp. 6.9    Garbanzo beans, cooked  1 cup 5.8    Kidney beans, cooked  1 cup 11.6    Lentils, red cooked  1 cup 13.6    Lima beans, cooked  1 cup 8.6    Oats, rolled dry  1 cup 12.0    Quinoa (seeds) dry  1/4 cup 6.2    Quinoa, cooked  1 cup 8.4    Pasta, whole wheat  1 cup 6.3    Peanuts  1 oz 2.3    Pistachio nuts  1 oz 3.1    Pumpkin seeds  1/4 cup 4.1    Soybeans, cooked  1 cup 8.6    Sunflower seeds  1/4 cup 3.0    Walnuts  1 oz 3.1            Vegetables  Serving Size Fiber (g)    Avocado (fruit)  1 medium 11.8    Beets, cooked  1 cup 2.8    Beet greens  1 cup 4.2    Bok johnson, cooked  1 cup 2.8    Broccoli, cooked  1 cup 4.5    [place] sprouts, cooked  1 cup 3.6    Cabbage, cooked  1 cup 4.2    Carrot  1 medium 2.6    Carrot, cooked  1 cup 5.2    Cauliflower, cooked  1 cup 3.4    Chad slaw  1 cup 4.0    Peg greens, cooked  1 cup 2.6    Corn, sweet  1 cup 4.6    Green beans  1 cup 4.0    Celery  1 stalk 1.1    Kale, cooked  1 cup 7.2    Onions, raw  1  cup 2.9    Peas, cooked  1 cup 8.8    Peppers, sweet  1 cup 2.6    Pop corn, air-popped  3 cups 3.6    Potato, baked w/ skin  1 medium 4.8    Spinach, cooked  1 cup 4.3    Summer squash, cooked  1 cup 2.5    Sweet potato, cooked  1 medium 4.9    Swiss chard, cooked  1 cup 3.7    Tomato  1 medium 1.0    Winter squash, cooked  1 cup 6.2    Zucchini, cooked  1 cup 2.6

## 2021-03-05 ENCOUNTER — TRANSCRIBE ORDERS (OUTPATIENT)
Dept: ADMINISTRATIVE | Facility: HOSPITAL | Age: 46
End: 2021-03-05

## 2021-03-05 ENCOUNTER — HOSPITAL ENCOUNTER (OUTPATIENT)
Dept: GENERAL RADIOLOGY | Facility: HOSPITAL | Age: 46
Discharge: HOME | End: 2021-03-05
Admitting: NURSE PRACTITIONER

## 2021-03-05 DIAGNOSIS — M54.9 DORSALGIA: Primary | ICD-10-CM

## 2021-03-05 DIAGNOSIS — M54.9 DORSALGIA: ICD-10-CM

## 2021-03-05 PROCEDURE — 72100 X-RAY EXAM L-S SPINE 2/3 VWS: CPT | Performed by: RADIOLOGY

## 2021-03-05 PROCEDURE — 72100 X-RAY EXAM L-S SPINE 2/3 VWS: CPT

## 2021-09-08 ENCOUNTER — HOSPITAL ENCOUNTER (OUTPATIENT)
Dept: GENERAL RADIOLOGY | Facility: HOSPITAL | Age: 46
Discharge: HOME | End: 2021-09-08

## 2021-09-08 ENCOUNTER — TRANSCRIBE ORDERS (OUTPATIENT)
Dept: ADMINISTRATIVE | Facility: HOSPITAL | Age: 46
End: 2021-09-08

## 2021-09-08 ENCOUNTER — HOSPITAL ENCOUNTER (OUTPATIENT)
Dept: RESPIRATORY THERAPY | Facility: HOSPITAL | Age: 46
Discharge: HOME | End: 2021-09-08

## 2021-09-08 DIAGNOSIS — R07.9 CHEST PAIN, UNSPECIFIED TYPE: ICD-10-CM

## 2021-09-08 DIAGNOSIS — E07.89 OTHER SPECIFIED DISORDERS OF THYROID: Primary | ICD-10-CM

## 2021-09-08 DIAGNOSIS — R07.9 CHEST PAIN, UNSPECIFIED TYPE: Primary | ICD-10-CM

## 2021-09-08 PROCEDURE — 71046 X-RAY EXAM CHEST 2 VIEWS: CPT | Performed by: RADIOLOGY

## 2021-09-08 PROCEDURE — 93005 ELECTROCARDIOGRAM TRACING: CPT | Performed by: NURSE PRACTITIONER

## 2021-09-08 PROCEDURE — 71046 X-RAY EXAM CHEST 2 VIEWS: CPT

## 2021-09-09 LAB
QT INTERVAL: 388 MS
QTC INTERVAL: 442 MS

## 2021-09-14 ENCOUNTER — TELEPHONE (OUTPATIENT)
Dept: CARDIOLOGY | Facility: CLINIC | Age: 46
End: 2021-09-14

## 2021-09-14 ENCOUNTER — OFFICE VISIT (OUTPATIENT)
Dept: CARDIOLOGY | Facility: CLINIC | Age: 46
End: 2021-09-14

## 2021-09-14 VITALS
OXYGEN SATURATION: 93 % | DIASTOLIC BLOOD PRESSURE: 81 MMHG | BODY MASS INDEX: 33.97 KG/M2 | RESPIRATION RATE: 16 BRPM | HEIGHT: 66 IN | HEART RATE: 76 BPM | TEMPERATURE: 97.1 F | SYSTOLIC BLOOD PRESSURE: 112 MMHG | WEIGHT: 211.4 LBS

## 2021-09-14 DIAGNOSIS — R06.02 SHORTNESS OF BREATH: Primary | ICD-10-CM

## 2021-09-14 DIAGNOSIS — G47.33 OSA (OBSTRUCTIVE SLEEP APNEA): ICD-10-CM

## 2021-09-14 DIAGNOSIS — E78.5 DYSLIPIDEMIA: ICD-10-CM

## 2021-09-14 DIAGNOSIS — R07.2 PRECORDIAL CHEST PAIN: ICD-10-CM

## 2021-09-14 PROCEDURE — 99204 OFFICE O/P NEW MOD 45 MIN: CPT | Performed by: SPECIALIST

## 2021-09-14 PROCEDURE — 93000 ELECTROCARDIOGRAM COMPLETE: CPT | Performed by: SPECIALIST

## 2021-09-14 RX ORDER — ALBUTEROL SULFATE 90 UG/1
AEROSOL, METERED RESPIRATORY (INHALATION) TAKE AS DIRECTED
COMMUNITY
Start: 2021-06-23 | End: 2021-12-03 | Stop reason: SDUPTHER

## 2021-09-14 RX ORDER — ALBUTEROL SULFATE 2.5 MG/3ML
SOLUTION RESPIRATORY (INHALATION) TAKE AS DIRECTED
COMMUNITY
Start: 2021-06-23

## 2021-09-14 RX ORDER — HYDROXYZINE PAMOATE 25 MG/1
25 CAPSULE ORAL 3 TIMES DAILY PRN
Status: ON HOLD | COMMUNITY
End: 2022-09-19

## 2021-09-14 RX ORDER — TRAMADOL HYDROCHLORIDE 50 MG/1
50 TABLET ORAL DAILY
COMMUNITY

## 2021-09-14 RX ORDER — CELECOXIB 200 MG/1
200 CAPSULE ORAL 2 TIMES DAILY
COMMUNITY

## 2021-09-14 NOTE — PROGRESS NOTES
"Subjective   Initial consultation, shortness of breath  Marisol Overton is a 45 y.o. female who presents to day for Chest Pain (rated 8, pressure), Palpitations (races), Shortness of Breath (\"lona\" , hx of asthma), Edema (LE), and Med Management (list provided).    CHIEF COMPLIANT  Chief Complaint   Patient presents with   • Chest Pain     rated 8, pressure   • Palpitations     races   • Shortness of Breath     \"lona\" , hx of asthma   • Edema     LE   • Med Management     list provided       Active Problems:  Problem List Items Addressed This Visit        Cardiac and Vasculature    Precordial chest pain    Relevant Orders    Stress Test With Myocardial Perfusion One Day    Adult Transthoracic Echo Complete w/ Color, Spectral and Contrast if necessary per protocol    Dyslipidemia    Relevant Orders    Lipid Panel    Comprehensive Metabolic Panel       Pulmonary and Pneumonias    Shortness of breath - Primary    Relevant Orders    Stress Test With Myocardial Perfusion One Day    Adult Transthoracic Echo Complete w/ Color, Spectral and Contrast if necessary per protocol       Sleep    BUCK (obstructive sleep apnea)    Relevant Orders    Home Sleep Study          HPI  HPI  For about a year or so she is having severe shortness of breath just walking few yards has been gradually getting worse she also had intermittent lower extremity edema also has intermittent retrosternal chest pain which is pressure-like she said also she has different heaviness on her chest which is constant all of the time not particular she is with exertion she also had admitted palpitations, She never smoked she denies hypertension no diabetes she was told in the past that she did have high cholesterol but did not take medications her mother has heart condition including valvular heart disease, she also feels very sleepy all the day and she snores at night with her legs shaking during sleeping hours she cannot keep her eyes open she also has " chronic low back pain and 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     • celecoxib (CeleBREX) 200 MG capsule Take 200 mg by mouth 2 (Two) Times a Day.     • cyanocobalamin 1000 MCG/ML injection INJECT 1 ML INTRAMUSCULARLY EVERY TWO WEEKS     • EPINEPHrine (EPIPEN IJ) Inject  as directed Take As Directed.     • escitalopram (LEXAPRO) 10 MG tablet Take 10 mg by mouth Every Morning.     • gabapentin (NEURONTIN) 800 MG tablet Take 800 mg by mouth 3 (Three) Times a Day.     • hydrOXYzine pamoate (VISTARIL) 25 MG capsule Take 25 mg by mouth 3 (Three) Times a Day As Needed for Itching.     • omeprazole (priLOSEC) 40 MG capsule Take 1 capsule by mouth Daily. 30 capsule 5   • topiramate (TOPAMAX) 25 MG tablet Take 25 mg by mouth 2 (Two) Times a Day.     • traMADol (ULTRAM) 50 MG tablet Take 50 mg by mouth Daily.     • busPIRone (BUSPAR) 10 MG tablet Take 10 mg by mouth 3 (Three) Times a Day.     • ibuprofen (ADVIL,MOTRIN) 800 MG tablet Take 800 mg by mouth Every 6 (Six) Hours As Needed for Mild Pain .     • SUMAtriptan (IMITREX) 100 MG tablet TAKE 1 TABLET BY MOUTH ONCE DAILY AS NEEDED FOR MIGRAINE HEADACHE     • traZODone (DESYREL) 150 MG tablet Take 150 mg by mouth At Night As Needed. for sleep     • Ventolin  (90 Base) MCG/ACT inhaler Take As Directed.       No current facility-administered medications on file prior to visit.       ALLERGIES  Shellfish-derived products, Other, and Watermelon flavor    HISTORY  Past Medical History:   Diagnosis Date   • Arthritis    • Asthma    • Elevated cholesterol    • BUCK (obstructive sleep apnea) 9/14/2021   • Ovarian cancer (CMS/HCC) 1997   • PONV (postoperative nausea and vomiting)        Social History     Socioeconomic History   • Marital status:      Spouse  "name: Not on file   • Number of children: Not on file   • Years of education: Not on file   • Highest education level: Not on file   Tobacco Use   • Smoking status: Never Smoker   • Smokeless tobacco: Never Used   Substance and Sexual Activity   • Alcohol use: Never   • Drug use: Never   • Sexual activity: Defer       Family History   Problem Relation Age of Onset   • Breast cancer Paternal Grandmother    • Lung cancer Maternal Grandmother        Review of Systems   Constitutional: Positive for appetite change, fatigue and fever.   HENT: Positive for hearing loss.         Swollen neck glands   Eyes: Positive for visual disturbance.   Respiratory: Positive for shortness of breath and wheezing.    Cardiovascular: Positive for chest pain and palpitations.   Gastrointestinal: Positive for diarrhea, nausea and vomiting.   Endocrine: Positive for polydipsia and polyuria.   Genitourinary: Positive for frequency and urgency.   Musculoskeletal: Positive for arthralgias, back pain and myalgias.   Skin: Positive for rash.   Neurological: Positive for light-headedness, numbness and headaches.   Psychiatric/Behavioral: Positive for dysphoric mood and sleep disturbance. The patient is nervous/anxious.        Objective     VITALS: /81 (BP Location: Right arm)   Pulse 76   Temp 97.1 °F (36.2 °C)   Resp 16   Ht 167.6 cm (66\")   Wt 95.9 kg (211 lb 6.4 oz)   SpO2 93%   BMI 34.12 kg/m²     LABS:   Lab Results (most recent)     None          IMAGING:   XR Chest PA & Lateral    Result Date: 9/8/2021  No evidence of active disease in the chest. A source of patient's chest pain is not demonstrated.  This report was finalized on 9/8/2021 12:37 PM by Dr. Aldair Beck II, MD.        EXAM:  Physical Exam  Vitals reviewed.   Constitutional:       Appearance: She is well-developed.   HENT:      Head: Normocephalic and atraumatic.   Eyes:      Pupils: Pupils are equal, round, and reactive to light.   Neck:      Thyroid: No " thyromegaly.      Vascular: No JVD.   Cardiovascular:      Rate and Rhythm: Normal rate and regular rhythm.      Heart sounds: Normal heart sounds. No murmur heard.   No friction rub. No gallop.    Pulmonary:      Effort: Pulmonary effort is normal. No respiratory distress.      Breath sounds: Normal breath sounds. No stridor. No wheezing or rales.   Chest:      Chest wall: No tenderness.   Musculoskeletal:         General: No tenderness or deformity.      Cervical back: Neck supple.   Skin:     General: Skin is warm and dry.   Neurological:      Mental Status: She is alert and oriented to person, place, and time.      Cranial Nerves: No cranial nerve deficit.      Coordination: Coordination normal.         Procedure     ECG 12 Lead    Date/Time: 9/14/2021 9:18 AM  Performed by: Nata Xiao MD  Authorized by: Nata Xiao MD           EKG: Normal sinus rhythm otherwise within normal limits compared with EKG on 9/8/2021 no significant change       Assessment/Plan     Diagnoses and all orders for this visit:    1. Shortness of breath (Primary)  -     Stress Test With Myocardial Perfusion One Day; Future  -     Adult Transthoracic Echo Complete w/ Color, Spectral and Contrast if necessary per protocol; Future    2. Precordial chest pain  -     Stress Test With Myocardial Perfusion One Day; Future  -     Adult Transthoracic Echo Complete w/ Color, Spectral and Contrast if necessary per protocol; Future    3. Dyslipidemia  -     Lipid Panel; Future  -     Comprehensive Metabolic Panel; Future    4. BUCK (obstructive sleep apnea)  -     Home Sleep Study; Future    Other orders  -     ECG 12 Lead      1. Will proceed with echocardiogram, to assess cardiac function, wall motion and valve morphology  2. With typical and atypical feature of angina, will proceed with stress testing to assess for ischemia  3. Will get lipid profile  4. Symptoms are c/w sleep apnea, will refer for sleep study  Return after stress  test.             MEDS ORDERED DURING VISIT:  No orders of the defined types were placed in this encounter.      As always, Lelo Sanchez APRN  I appreciate very much the opportunity to participate in the cardiovascular care of your patients. Please do not hesitate to call me with any questions with regards to Marisol Overton evaluation and management.         This document has been electronically signed by Nata Xiao MD  September 14, 2021 09:58 EDT

## 2021-09-14 NOTE — TELEPHONE ENCOUNTER
Patient is asking what she needs to ask her PCP to look for when ordering her CT of her chest since she has has COVID.  She has forgotten what it was called

## 2021-09-16 ENCOUNTER — HOSPITAL ENCOUNTER (OUTPATIENT)
Dept: ULTRASOUND IMAGING | Facility: HOSPITAL | Age: 46
Discharge: HOME | End: 2021-09-16
Admitting: NURSE PRACTITIONER

## 2021-09-16 DIAGNOSIS — E07.89 OTHER SPECIFIED DISORDERS OF THYROID: ICD-10-CM

## 2021-09-16 PROCEDURE — 76536 US EXAM OF HEAD AND NECK: CPT | Performed by: RADIOLOGY

## 2021-09-16 PROCEDURE — 76536 US EXAM OF HEAD AND NECK: CPT

## 2021-09-22 ENCOUNTER — HOSPITAL ENCOUNTER (OUTPATIENT)
Dept: ULTRASOUND IMAGING | Facility: HOSPITAL | Age: 46
Discharge: HOME | End: 2021-09-22

## 2021-09-22 ENCOUNTER — HOSPITAL ENCOUNTER (OUTPATIENT)
Dept: MAMMOGRAPHY | Facility: HOSPITAL | Age: 46
Discharge: HOME | End: 2021-09-22

## 2021-09-22 DIAGNOSIS — N63.21 BREAST LUMP ON LEFT SIDE AT 1 O'CLOCK POSITION: ICD-10-CM

## 2021-09-22 PROCEDURE — 76642 ULTRASOUND BREAST LIMITED: CPT

## 2021-09-22 PROCEDURE — 77062 BREAST TOMOSYNTHESIS BI: CPT | Performed by: RADIOLOGY

## 2021-09-22 PROCEDURE — 77066 DX MAMMO INCL CAD BI: CPT | Performed by: RADIOLOGY

## 2021-09-22 PROCEDURE — G0279 TOMOSYNTHESIS, MAMMO: HCPCS

## 2021-09-22 PROCEDURE — 76642 ULTRASOUND BREAST LIMITED: CPT | Performed by: RADIOLOGY

## 2021-09-22 PROCEDURE — 77066 DX MAMMO INCL CAD BI: CPT

## 2021-10-06 ENCOUNTER — HOSPITAL ENCOUNTER (OUTPATIENT)
Dept: CARDIOLOGY | Facility: HOSPITAL | Age: 46
Discharge: HOME | End: 2021-10-06

## 2021-10-06 ENCOUNTER — LAB (OUTPATIENT)
Dept: LAB | Facility: HOSPITAL | Age: 46
End: 2021-10-06

## 2021-10-06 ENCOUNTER — HOSPITAL ENCOUNTER (OUTPATIENT)
Dept: NUCLEAR MEDICINE | Facility: HOSPITAL | Age: 46
Discharge: HOME | End: 2021-10-06

## 2021-10-06 DIAGNOSIS — R06.02 SHORTNESS OF BREATH: ICD-10-CM

## 2021-10-06 DIAGNOSIS — E78.5 DYSLIPIDEMIA: ICD-10-CM

## 2021-10-06 DIAGNOSIS — R07.2 PRECORDIAL CHEST PAIN: ICD-10-CM

## 2021-10-06 LAB
ALBUMIN SERPL-MCNC: 4.1 G/DL (ref 3.5–5.2)
ALBUMIN/GLOB SERPL: 1.8 G/DL
ALP SERPL-CCNC: 56 U/L (ref 39–117)
ALT SERPL W P-5'-P-CCNC: 25 U/L (ref 1–33)
ANION GAP SERPL CALCULATED.3IONS-SCNC: 9.7 MMOL/L (ref 5–15)
AST SERPL-CCNC: 11 U/L (ref 1–32)
BH CV ECHO MEAS - ACS: 2.2 CM
BH CV ECHO MEAS - AO ROOT AREA (BSA CORRECTED): 1.4
BH CV ECHO MEAS - AO ROOT AREA: 6.2 CM^2
BH CV ECHO MEAS - AO ROOT DIAM: 2.8 CM
BH CV ECHO MEAS - BSA(HAYCOCK): 2.1 M^2
BH CV ECHO MEAS - BSA: 2 M^2
BH CV ECHO MEAS - BZI_BMI: 34.1 KILOGRAMS/M^2
BH CV ECHO MEAS - BZI_METRIC_HEIGHT: 167.6 CM
BH CV ECHO MEAS - BZI_METRIC_WEIGHT: 95.7 KG
BH CV ECHO MEAS - EDV(CUBED): 85.2 ML
BH CV ECHO MEAS - EDV(MOD-SP4): 45 ML
BH CV ECHO MEAS - EDV(TEICH): 87.7 ML
BH CV ECHO MEAS - EF(CUBED): 45.2 %
BH CV ECHO MEAS - EF(MOD-SP4): 63.1 %
BH CV ECHO MEAS - EF(TEICH): 37.9 %
BH CV ECHO MEAS - ESV(CUBED): 46.7 ML
BH CV ECHO MEAS - ESV(MOD-SP4): 16.6 ML
BH CV ECHO MEAS - ESV(TEICH): 54.4 ML
BH CV ECHO MEAS - FS: 18.2 %
BH CV ECHO MEAS - IVS/LVPW: 1.4
BH CV ECHO MEAS - IVSD: 1.2 CM
BH CV ECHO MEAS - LA DIMENSION: 3.9 CM
BH CV ECHO MEAS - LA/AO: 1.4
BH CV ECHO MEAS - LV DIASTOLIC VOL/BSA (35-75): 22 ML/M^2
BH CV ECHO MEAS - LV MASS(C)D: 215.1 GRAMS
BH CV ECHO MEAS - LV MASS(C)DI: 105.1 GRAMS/M^2
BH CV ECHO MEAS - LV SYSTOLIC VOL/BSA (12-30): 8.1 ML/M^2
BH CV ECHO MEAS - LVIDD: 4.4 CM
BH CV ECHO MEAS - LVIDS: 3.6 CM
BH CV ECHO MEAS - LVLD AP4: 7.6 CM
BH CV ECHO MEAS - LVLS AP4: 7.1 CM
BH CV ECHO MEAS - LVOT AREA (M): 3.1 CM^2
BH CV ECHO MEAS - LVOT AREA: 3.1 CM^2
BH CV ECHO MEAS - LVOT DIAM: 2 CM
BH CV ECHO MEAS - LVPWD: 1.1 CM
BH CV ECHO MEAS - MV A MAX VEL: 74.1 CM/SEC
BH CV ECHO MEAS - MV E MAX VEL: 93.4 CM/SEC
BH CV ECHO MEAS - MV E/A: 1.3
BH CV ECHO MEAS - PA ACC TIME: 0.12 SEC
BH CV ECHO MEAS - PA PR(ACCEL): 26.8 MMHG
BH CV ECHO MEAS - RAP SYSTOLE: 10 MMHG
BH CV ECHO MEAS - RVSP: 28.1 MMHG
BH CV ECHO MEAS - SI(CUBED): 18.8 ML/M^2
BH CV ECHO MEAS - SI(MOD-SP4): 13.9 ML/M^2
BH CV ECHO MEAS - SI(TEICH): 16.3 ML/M^2
BH CV ECHO MEAS - SV(CUBED): 38.5 ML
BH CV ECHO MEAS - SV(MOD-SP4): 28.4 ML
BH CV ECHO MEAS - SV(TEICH): 33.3 ML
BH CV ECHO MEAS - TR MAX VEL: 213 CM/SEC
BH CV NUCLEAR PRIOR STUDY: 3
BH CV REST NUCLEAR ISOTOPE DOSE: 10.2 MCI
BH CV STRESS BP STAGE 1: NORMAL
BH CV STRESS BP STAGE 2: NORMAL
BH CV STRESS COMMENTS STAGE 1: NORMAL
BH CV STRESS COMMENTS STAGE 2: NORMAL
BH CV STRESS DOSE REGADENOSON STAGE 1: 0.4
BH CV STRESS DURATION MIN STAGE 1: 0
BH CV STRESS DURATION MIN STAGE 2: 4
BH CV STRESS DURATION SEC STAGE 1: 10
BH CV STRESS DURATION SEC STAGE 2: 0
BH CV STRESS HR STAGE 1: 118
BH CV STRESS HR STAGE 2: 115
BH CV STRESS NUCLEAR ISOTOPE DOSE: 30.8 MCI
BH CV STRESS PROTOCOL 1: NORMAL
BH CV STRESS RECOVERY BP: NORMAL MMHG
BH CV STRESS RECOVERY HR: 72 BPM
BH CV STRESS STAGE 1: 1
BH CV STRESS STAGE 2: 2
BILIRUB SERPL-MCNC: 0.2 MG/DL (ref 0–1.2)
BUN SERPL-MCNC: 15 MG/DL (ref 6–20)
BUN/CREAT SERPL: 18.3 (ref 7–25)
CALCIUM SPEC-SCNC: 8.9 MG/DL (ref 8.6–10.5)
CHLORIDE SERPL-SCNC: 108 MMOL/L (ref 98–107)
CHOLEST SERPL-MCNC: 163 MG/DL (ref 0–200)
CO2 SERPL-SCNC: 20.3 MMOL/L (ref 22–29)
CREAT SERPL-MCNC: 0.82 MG/DL (ref 0.57–1)
GFR SERPL CREATININE-BSD FRML MDRD: 76 ML/MIN/1.73
GLOBULIN UR ELPH-MCNC: 2.3 GM/DL
GLUCOSE SERPL-MCNC: 121 MG/DL (ref 65–99)
HDLC SERPL-MCNC: 27 MG/DL (ref 40–60)
LDLC SERPL CALC-MCNC: 98 MG/DL (ref 0–100)
LDLC/HDLC SERPL: 3.4 {RATIO}
LV EF NUC BP: 67 %
MAXIMAL PREDICTED HEART RATE: 177 BPM
MAXIMAL PREDICTED HEART RATE: 177 BPM
PERCENT MAX PREDICTED HR: 64.97 %
POTASSIUM SERPL-SCNC: 4 MMOL/L (ref 3.5–5.2)
PROT SERPL-MCNC: 6.4 G/DL (ref 6–8.5)
SODIUM SERPL-SCNC: 138 MMOL/L (ref 136–145)
STRESS BASELINE BP: NORMAL MMHG
STRESS BASELINE HR: 71 BPM
STRESS PERCENT HR: 76 %
STRESS POST PEAK BP: NORMAL MMHG
STRESS POST PEAK HR: 115 BPM
STRESS TARGET HR: 150 BPM
STRESS TARGET HR: 150 BPM
TRIGL SERPL-MCNC: 221 MG/DL (ref 0–150)
VLDLC SERPL-MCNC: 38 MG/DL (ref 5–40)

## 2021-10-06 PROCEDURE — 93306 TTE W/DOPPLER COMPLETE: CPT

## 2021-10-06 PROCEDURE — 93017 CV STRESS TEST TRACING ONLY: CPT

## 2021-10-06 PROCEDURE — 93018 CV STRESS TEST I&R ONLY: CPT | Performed by: SPECIALIST

## 2021-10-06 PROCEDURE — 93306 TTE W/DOPPLER COMPLETE: CPT | Performed by: SPECIALIST

## 2021-10-06 PROCEDURE — A9500 TC99M SESTAMIBI: HCPCS | Performed by: SPECIALIST

## 2021-10-06 PROCEDURE — 78452 HT MUSCLE IMAGE SPECT MULT: CPT

## 2021-10-06 PROCEDURE — 36415 COLL VENOUS BLD VENIPUNCTURE: CPT

## 2021-10-06 PROCEDURE — 0 TECHNETIUM SESTAMIBI: Performed by: SPECIALIST

## 2021-10-06 PROCEDURE — 80053 COMPREHEN METABOLIC PANEL: CPT

## 2021-10-06 PROCEDURE — 25010000002 REGADENOSON 0.4 MG/5ML SOLUTION: Performed by: SPECIALIST

## 2021-10-06 PROCEDURE — 78452 HT MUSCLE IMAGE SPECT MULT: CPT | Performed by: SPECIALIST

## 2021-10-06 PROCEDURE — 80061 LIPID PANEL: CPT

## 2021-10-06 RX ADMIN — TECHNETIUM TC 99M SESTAMIBI 1 DOSE: 1 INJECTION INTRAVENOUS at 10:29

## 2021-10-06 RX ADMIN — TECHNETIUM TC 99M SESTAMIBI 1 DOSE: 1 INJECTION INTRAVENOUS at 09:08

## 2021-10-06 RX ADMIN — REGADENOSON 0.4 MG: 0.08 INJECTION, SOLUTION INTRAVENOUS at 10:29

## 2021-10-26 ENCOUNTER — OFFICE VISIT (OUTPATIENT)
Dept: CARDIOLOGY | Facility: CLINIC | Age: 46
End: 2021-10-26

## 2021-10-26 VITALS
WEIGHT: 216.4 LBS | RESPIRATION RATE: 18 BRPM | DIASTOLIC BLOOD PRESSURE: 84 MMHG | OXYGEN SATURATION: 100 % | TEMPERATURE: 97.1 F | SYSTOLIC BLOOD PRESSURE: 138 MMHG | HEIGHT: 66 IN | BODY MASS INDEX: 34.78 KG/M2 | HEART RATE: 71 BPM

## 2021-10-26 DIAGNOSIS — E78.5 DYSLIPIDEMIA: ICD-10-CM

## 2021-10-26 DIAGNOSIS — R06.83 SNORING: ICD-10-CM

## 2021-10-26 DIAGNOSIS — R07.2 PRECORDIAL CHEST PAIN: Primary | ICD-10-CM

## 2021-10-26 DIAGNOSIS — R06.02 SHORTNESS OF BREATH: ICD-10-CM

## 2021-10-26 PROCEDURE — 99214 OFFICE O/P EST MOD 30 MIN: CPT | Performed by: SPECIALIST

## 2021-10-26 NOTE — PROGRESS NOTES
Subjective   Follow up, stress test result  Marisol Overton is a 45 y.o. female who presents to day for Follow-up (echo,stress,sleep study) and Follow-up (lab results ).    CHIEF COMPLIANT  Chief Complaint   Patient presents with   • Follow-up     echo,stress,sleep study   • Follow-up     lab results        Active Problems:  Problem List Items Addressed This Visit        Cardiac and Vasculature    Precordial chest pain - Primary    Dyslipidemia       Pulmonary and Pneumonias    Shortness of breath    Relevant Orders    Ambulatory Referral to Pulmonology       Sleep    BUCK (obstructive sleep apnea)          HPI  HPI  Still with occasional chest pain, on and off, mostly at rest, still with shortness of breath class II, with edema end of the day, rare palpitations, with heart skipping  PRIOR MEDS  Current Outpatient Medications on File Prior to Visit   Medication Sig Dispense Refill   • albuterol (PROVENTIL) (2.5 MG/3ML) 0.083% nebulizer solution Take As Directed.     • busPIRone (BUSPAR) 10 MG tablet Take 10 mg by mouth 3 (Three) Times a Day.     • celecoxib (CeleBREX) 200 MG capsule Take 200 mg by mouth 2 (Two) Times a Day.     • cyanocobalamin 1000 MCG/ML injection INJECT 1 ML INTRAMUSCULARLY EVERY TWO WEEKS     • EPINEPHrine (EPIPEN IJ) Inject  as directed Take As Directed.     • escitalopram (LEXAPRO) 10 MG tablet Take 10 mg by mouth Every Morning.     • gabapentin (NEURONTIN) 800 MG tablet Take 800 mg by mouth 3 (Three) Times a Day.     • hydrOXYzine pamoate (VISTARIL) 25 MG capsule Take 25 mg by mouth 3 (Three) Times a Day As Needed for Itching.     • ibuprofen (ADVIL,MOTRIN) 800 MG tablet Take 800 mg by mouth Every 6 (Six) Hours As Needed for Mild Pain .     • omeprazole (priLOSEC) 40 MG capsule Take 1 capsule by mouth Daily. 30 capsule 5   • SUMAtriptan (IMITREX) 100 MG tablet TAKE 1 TABLET BY MOUTH ONCE DAILY AS NEEDED FOR MIGRAINE HEADACHE     • topiramate (TOPAMAX) 25 MG tablet Take 25 mg by mouth 2 (Two)  "Times a Day.     • traMADol (ULTRAM) 50 MG tablet Take 50 mg by mouth Daily.     • traZODone (DESYREL) 150 MG tablet Take 150 mg by mouth At Night As Needed. for sleep     • Ventolin  (90 Base) MCG/ACT inhaler Take As Directed.       No current facility-administered medications on file prior to visit.       ALLERGIES  Shellfish-derived products, Other, and Watermelon flavor    HISTORY  Past Medical History:   Diagnosis Date   • Arthritis    • Asthma    • Elevated cholesterol    • BUCK (obstructive sleep apnea) 9/14/2021   • Ovarian cancer (HCC) 1997   • PONV (postoperative nausea and vomiting)        Social History     Socioeconomic History   • Marital status:    Tobacco Use   • Smoking status: Never Smoker   • Smokeless tobacco: Never Used   Vaping Use   • Vaping Use: Never used   Substance and Sexual Activity   • Alcohol use: Never   • Drug use: Never   • Sexual activity: Defer       Family History   Problem Relation Age of Onset   • Breast cancer Paternal Grandmother    • Lung cancer Maternal Grandmother        Review of Systems   Respiratory: Positive for chest tightness and shortness of breath. Negative for apnea, cough, choking, wheezing and stridor.    Cardiovascular: Positive for palpitations and leg swelling. Negative for chest pain.       Objective     VITALS: /84 (BP Location: Right arm, Patient Position: Sitting, Cuff Size: Adult)   Pulse 71   Temp 97.1 °F (36.2 °C)   Resp 18   Ht 167.6 cm (65.98\")   Wt 98.2 kg (216 lb 6.4 oz)   SpO2 100%   BMI 34.94 kg/m²     LABS:   Lab Results (most recent)     None          IMAGING:   US Thyroid    Result Date: 9/16/2021  Several small nodules, likely multinodular goiter, but no large nodules were identified.  This report was finalized on 9/16/2021 3:01 PM by Dr. Aj Sky MD.      XR Chest PA & Lateral    Result Date: 9/8/2021  No evidence of active disease in the chest. A source of patient's chest pain is not demonstrated.  This report " was finalized on 9/8/2021 12:37 PM by Dr. Aldair Beck II, MD.      US Breast Bilateral Limited    Result Date: 9/22/2021  Benign bilateral mammographic and focused sonographic findings.   BI-RADS CATEGORY:  2, BENIGN   RECOMMENDATION:  Recommend clinical follow-up of the left breast. Any clinically suspicious palpable finding should be biopsied regardless of imaging findings. Also recommend the patient resume routine annual screening mammography.  CAD was utilized.  The standard false-negative rate of mammography is between 10% and 25%. Complex patterns or increased breast density will markedly elevate the false-negative rate of mammography.    The patient was notified of the results and recommendations at the time of her visit.  Additionally, a letter, in lay terminology, with the results of this exam will be mailed to the patient.  This report was finalized on 9/22/2021 11:28 AM by Dr. Chioma Sierra MD.      Mammo Diagnostic Digital Tomosynthesis Bilateral With CAD    Result Date: 9/22/2021  Benign bilateral mammographic and focused sonographic findings.   BI-RADS CATEGORY:  2, BENIGN   RECOMMENDATION:  Recommend clinical follow-up of the left breast. Any clinically suspicious palpable finding should be biopsied regardless of imaging findings. Also recommend the patient resume routine annual screening mammography.  CAD was utilized.  The standard false-negative rate of mammography is between 10% and 25%. Complex patterns or increased breast density will markedly elevate the false-negative rate of mammography.    The patient was notified of the results and recommendations at the time of her visit.  Additionally, a letter, in lay terminology, with the results of this exam will be mailed to the patient.  This report was finalized on 9/22/2021 11:28 AM by Dr. Chioma Sierra MD.        EXAM:  Physical Exam  Vitals reviewed.   Constitutional:       Appearance: She is well-developed.   HENT:      Head:  Normocephalic and atraumatic.   Eyes:      Pupils: Pupils are equal, round, and reactive to light.   Neck:      Thyroid: No thyromegaly.      Vascular: No JVD.   Cardiovascular:      Rate and Rhythm: Normal rate and regular rhythm.      Heart sounds: Normal heart sounds. No murmur heard.  No friction rub. No gallop.    Pulmonary:      Effort: Pulmonary effort is normal. No respiratory distress.      Breath sounds: Normal breath sounds. No stridor. No wheezing or rales.   Chest:      Chest wall: No tenderness.   Musculoskeletal:         General: No tenderness or deformity.      Cervical back: Neck supple.   Skin:     General: Skin is warm and dry.   Neurological:      Mental Status: She is alert and oriented to person, place, and time.      Cranial Nerves: No cranial nerve deficit.      Coordination: Coordination normal.         Procedure   Procedures       Assessment/Plan     Diagnoses and all orders for this visit:    1. Precordial chest pain (Primary)    2. Shortness of breath  -     Ambulatory Referral to Pulmonology    3. Dyslipidemia    4. BUCK (obstructive sleep apnea)    1.  We discussed the results of the stress test with no ischemia she still getting chest pain on and off and continues to be quite short of breath since she has recovered from the COVID-19 infection we discussed also the echocardiogram which showed normal systolic function so I am going to refer her to pulmonology for assessment for my concern for post COVID-19 pulmonary fibrosis for assessment referred to pulmonology for lung function study  2.  Her blood pressure today is borderline we will monitor for now  3.  We discussed the results of the labs which showed fasting glucose is high at 121 which is consistent with prediabetes also with elevated triglycerides and low HDL also suboptimal LDL of 98 she will discuss with her primary physician regarding management of the prediabetes I am not we will start her yet on a cholesterol medicine will  not she would diet and eat healthy and then will repeat the labs in 3 to 6-month if still elevated will start on a statin  4.  Had a sleep study negative, with AHI 0.9 , sleep hygiene discussed  5.  Polypharmacy I discussed with her about the issue about taking many medications to discuss her primary care physician regarding the essentials    Return in about 3 months (around 1/26/2022).             MEDS ORDERED DURING VISIT:  No orders of the defined types were placed in this encounter.      As always, Lelo Sanchez APRN  I appreciate very much the opportunity to participate in the cardiovascular care of your patients. Please do not hesitate to call me with any questions with regards to Marisol Overton evaluation and management.         This document has been electronically signed by Nata Xiao MD  October 26, 2021 09:56 EDT

## 2021-12-03 ENCOUNTER — PATIENT ROUNDING (BHMG ONLY) (OUTPATIENT)
Dept: PULMONOLOGY | Facility: CLINIC | Age: 46
End: 2021-12-03

## 2021-12-03 ENCOUNTER — OFFICE VISIT (OUTPATIENT)
Dept: PULMONOLOGY | Facility: CLINIC | Age: 46
End: 2021-12-03

## 2021-12-03 VITALS
BODY MASS INDEX: 32.95 KG/M2 | OXYGEN SATURATION: 98 % | DIASTOLIC BLOOD PRESSURE: 84 MMHG | HEART RATE: 83 BPM | TEMPERATURE: 99.3 F | WEIGHT: 205 LBS | SYSTOLIC BLOOD PRESSURE: 118 MMHG | HEIGHT: 66 IN

## 2021-12-03 DIAGNOSIS — J45.20 MILD INTERMITTENT ASTHMA WITHOUT COMPLICATION: Primary | ICD-10-CM

## 2021-12-03 DIAGNOSIS — Z01.818 OTHER SPECIFIED PRE-OPERATIVE EXAMINATION: Primary | ICD-10-CM

## 2021-12-03 DIAGNOSIS — R91.8 PULMONARY INFILTRATE: ICD-10-CM

## 2021-12-03 DIAGNOSIS — Z86.16 HISTORY OF COVID-19: ICD-10-CM

## 2021-12-03 DIAGNOSIS — E66.9 OBESITY (BMI 30-39.9): ICD-10-CM

## 2021-12-03 DIAGNOSIS — R06.02 SHORTNESS OF BREATH: ICD-10-CM

## 2021-12-03 PROBLEM — R06.83 SNORING: Status: RESOLVED | Noted: 2021-10-26 | Resolved: 2021-12-03

## 2021-12-03 PROBLEM — G47.33 OSA (OBSTRUCTIVE SLEEP APNEA): Status: RESOLVED | Noted: 2021-09-14 | Resolved: 2021-12-03

## 2021-12-03 PROCEDURE — 99203 OFFICE O/P NEW LOW 30 MIN: CPT | Performed by: NURSE PRACTITIONER

## 2021-12-03 RX ORDER — ALBUTEROL SULFATE 90 UG/1
2 AEROSOL, METERED RESPIRATORY (INHALATION) EVERY 4 HOURS PRN
Qty: 18 G | Refills: 3 | Status: SHIPPED | OUTPATIENT
Start: 2021-12-03

## 2021-12-03 RX ORDER — METFORMIN HYDROCHLORIDE 500 MG/1
500 TABLET, EXTENDED RELEASE ORAL DAILY
COMMUNITY
Start: 2021-12-01

## 2021-12-03 RX ORDER — TOPIRAMATE 50 MG/1
TABLET, FILM COATED ORAL
COMMUNITY
Start: 2021-11-16 | End: 2021-12-03

## 2021-12-03 NOTE — PROGRESS NOTES
December 3, 2021    Hello, may I speak with Marisol Overton?    My name is familia macias      I am  with [facility]  [address]  [facility] [facility] [id number] [phone].    Before we get started may I verify your date of birth? 1975    I am calling to officially welcome you to our practice and ask about your recent visit. Is this a good time to talk? yes    Tell me about your visit with us. What things went well?  Staff was kind, provider was pleasant         We're always looking for ways to make our patients' experiences even better. Do you have recommendations on ways we may improve?  no    Overall were you satisfied with your first visit to our practice? yes       I appreciate you taking the time to speak with me today. Is there anything else I can do for you? no      Thank you, and have a great day.

## 2021-12-03 NOTE — PROGRESS NOTES
"Chief Complaint  Shortness of Breath    Subjective          Marisol Overton presents to [facility]  History of Present Illness     Ms. Overton is a 46 year old female with a medical history significant for Arthritis, asthma, diabetes and hyperlipidemia.    She presents today for evaluation of continued shortness of breath from COVID.  She states that she had COVID in November 2020.  She states that since this time she has had continued shortness of breath and fatigue.  She states that she is a non smoker.  She does have asthma and is currently taking albuterol PRN.  She states that she is also taking anoro and wixela.  She tells me that she has had a sleep study but that it was negative.        Objective   Vital Signs:   /84 (BP Location: Right arm, Patient Position: Sitting)   Pulse 83   Temp 99.3 °F (37.4 °C)   Ht 167.6 cm (66\")   Wt 93 kg (205 lb)   SpO2 98%   BMI 33.09 kg/m²     Physical Exam     GENERAL APPEARANCE: Well developed, well nourished, alert and cooperative, and appears to be in no acute distress.    HEAD: normocephalic. Atraumatic.    EYES: PERRL, EOMI. Vision is grossly intact.    THROAT: Oral cavity and pharynx normal. No inflammation, swelling, exudate, or lesions.     NECK: Neck supple.  No thyromegaly.    CARDIAC: Normal S1 and S2. No S3, S4 or murmurs. Rhythm is regular.     RESPIRATORY:Bilateral air entry positive. Bilateral diminished breath sounds. No wheezing, crackles or rhonchi noted.    GI: Positive bowel sounds. Soft, nondistended, nontender.     MUSCULOSKELETAL: No significant deformity or joint abnormality. No edema. Peripheral pulses intact. No varicosities.    NEUROLOGICAL: Strength and sensation symmetric and intact throughout.     PSYCHIATRIC: The mental examination revealed the patient was oriented to person, place, and time.     Result Review :   The following data was reviewed by: ARMANDO Garcia on " 12/03/2021:  Common labs    Common Labsle 10/6/21 10/6/21    [id number]   Glucose  121 (A)   BUN  15   Creatinine  0.82   eGFR Non African Am  76   Sodium  138   Potassium  4.0   Chloride  108 (A)   Calcium  8.9   Albumin  4.10   Total Bilirubin  0.2   Alkaline Phosphatase  56   AST (SGOT)  11   ALT (SGPT)  25   Total Cholesterol 163    Triglycerides 221 (A)    HDL Cholesterol 27 (A)    LDL Cholesterol  98    (A) Abnormal value                   Assessment and Plan    Diagnoses and all orders for this visit:    1. Mild intermittent asthma without complication (Primary)  -     Full Pulmonary Function Test With Bronchodilator; Future  -     IgE Level; Future    2. Shortness of breath  -     Full Pulmonary Function Test With Bronchodilator; Future  -     CBC & Differential; Future  -     IgE Level; Future    3. History of COVID-19  -     CT Chest Without Contrast; Future    4. Obesity (BMI 30-39.9)    5. Pulmonary infiltrate  -     CT Chest Without Contrast; Future    Other orders  -     Fluticasone Furoate-Vilanterol (BREO ELLIPTA) 200-25 MCG/INH inhaler; Inhale 1 puff Daily.  Dispense: 60 each; Refill: 5  -     Ventolin  (90 Base) MCG/ACT inhaler; Inhale 2 puffs Every 4 (Four) Hours As Needed for Wheezing.  Dispense: 18 g; Refill: 3           Shortness of breath/ mild intermittent asthma:  -Will order PFT to assess lung function.  -Will have him stop taking wixela and anoro and start her on Breo once daily.  -Will order CBC and IgE level.  -Will send refills on albuterol.      History of COVID-19/pulmonary infiltrates:    -Will order a CT Chest without contrast to evaluate for resolution of pulmonary infiltrates.  Patient's Body mass index is 33.09 kg/m². indicating that she is obese (BMI >30). Obesity-related health conditions include the following: diabetes mellitus. Obesity is unchanged. BMI is is above average; BMI management plan is completed. We discussed portion control and increasing  exercise..          Follow Up   Return in about 3 months (around 3/3/2022).  Patient was given instructions and counseling regarding her condition or for health maintenance advice. Please see specific information pulled into the AVS if appropriate.

## 2021-12-16 ENCOUNTER — HOSPITAL ENCOUNTER (OUTPATIENT)
Dept: RESPIRATORY THERAPY | Facility: HOSPITAL | Age: 46
Discharge: HOME | End: 2021-12-16

## 2021-12-16 ENCOUNTER — LAB (OUTPATIENT)
Dept: LAB | Facility: HOSPITAL | Age: 46
End: 2021-12-16

## 2021-12-16 ENCOUNTER — HOSPITAL ENCOUNTER (OUTPATIENT)
Dept: CT IMAGING | Facility: HOSPITAL | Age: 46
Discharge: HOME | End: 2021-12-16

## 2021-12-16 DIAGNOSIS — R91.8 PULMONARY INFILTRATE: ICD-10-CM

## 2021-12-16 DIAGNOSIS — Z01.818 OTHER SPECIFIED PRE-OPERATIVE EXAMINATION: ICD-10-CM

## 2021-12-16 DIAGNOSIS — J45.20 MILD INTERMITTENT ASTHMA WITHOUT COMPLICATION: ICD-10-CM

## 2021-12-16 DIAGNOSIS — R06.02 SHORTNESS OF BREATH: ICD-10-CM

## 2021-12-16 DIAGNOSIS — Z86.16 HISTORY OF COVID-19: ICD-10-CM

## 2021-12-16 LAB
BASOPHILS # BLD AUTO: 0.03 10*3/MM3 (ref 0–0.2)
BASOPHILS NFR BLD AUTO: 0.3 % (ref 0–1.5)
DEPRECATED RDW RBC AUTO: 40.5 FL (ref 37–54)
EOSINOPHIL # BLD AUTO: 0.13 10*3/MM3 (ref 0–0.4)
EOSINOPHIL NFR BLD AUTO: 1.5 % (ref 0.3–6.2)
ERYTHROCYTE [DISTWIDTH] IN BLOOD BY AUTOMATED COUNT: 12.8 % (ref 12.3–15.4)
HCT VFR BLD AUTO: 41.4 % (ref 34–46.6)
HGB BLD-MCNC: 13.5 G/DL (ref 12–15.9)
IMM GRANULOCYTES # BLD AUTO: 0.02 10*3/MM3 (ref 0–0.05)
IMM GRANULOCYTES NFR BLD AUTO: 0.2 % (ref 0–0.5)
LYMPHOCYTES # BLD AUTO: 2.11 10*3/MM3 (ref 0.7–3.1)
LYMPHOCYTES NFR BLD AUTO: 23.8 % (ref 19.6–45.3)
MCH RBC QN AUTO: 28.2 PG (ref 26.6–33)
MCHC RBC AUTO-ENTMCNC: 32.6 G/DL (ref 31.5–35.7)
MCV RBC AUTO: 86.4 FL (ref 79–97)
MONOCYTES # BLD AUTO: 0.71 10*3/MM3 (ref 0.1–0.9)
MONOCYTES NFR BLD AUTO: 8 % (ref 5–12)
NEUTROPHILS NFR BLD AUTO: 5.86 10*3/MM3 (ref 1.7–7)
NEUTROPHILS NFR BLD AUTO: 66.2 % (ref 42.7–76)
NRBC BLD AUTO-RTO: 0 /100 WBC (ref 0–0.2)
PLATELET # BLD AUTO: 306 10*3/MM3 (ref 140–450)
PMV BLD AUTO: 9 FL (ref 6–12)
RBC # BLD AUTO: 4.79 10*6/MM3 (ref 3.77–5.28)
SARS-COV-2 RNA RESP QL NAA+PROBE: NOT DETECTED
WBC NRBC COR # BLD: 8.86 10*3/MM3 (ref 3.4–10.8)

## 2021-12-16 PROCEDURE — 36415 COLL VENOUS BLD VENIPUNCTURE: CPT

## 2021-12-16 PROCEDURE — 94726 PLETHYSMOGRAPHY LUNG VOLUMES: CPT | Performed by: INTERNAL MEDICINE

## 2021-12-16 PROCEDURE — 71250 CT THORAX DX C-: CPT | Performed by: RADIOLOGY

## 2021-12-16 PROCEDURE — 85025 COMPLETE CBC W/AUTO DIFF WBC: CPT

## 2021-12-16 PROCEDURE — 94726 PLETHYSMOGRAPHY LUNG VOLUMES: CPT

## 2021-12-16 PROCEDURE — 82785 ASSAY OF IGE: CPT

## 2021-12-16 PROCEDURE — 94640 AIRWAY INHALATION TREATMENT: CPT

## 2021-12-16 PROCEDURE — 71250 CT THORAX DX C-: CPT

## 2021-12-16 PROCEDURE — 94729 DIFFUSING CAPACITY: CPT

## 2021-12-16 PROCEDURE — 87635 SARS-COV-2 COVID-19 AMP PRB: CPT

## 2021-12-16 PROCEDURE — 94729 DIFFUSING CAPACITY: CPT | Performed by: INTERNAL MEDICINE

## 2021-12-16 PROCEDURE — C9803 HOPD COVID-19 SPEC COLLECT: HCPCS

## 2021-12-16 PROCEDURE — 94060 EVALUATION OF WHEEZING: CPT | Performed by: INTERNAL MEDICINE

## 2021-12-16 PROCEDURE — 94060 EVALUATION OF WHEEZING: CPT

## 2021-12-16 RX ORDER — ALBUTEROL SULFATE 2.5 MG/3ML
2.5 SOLUTION RESPIRATORY (INHALATION) ONCE
Status: COMPLETED | OUTPATIENT
Start: 2021-12-16 | End: 2021-12-16

## 2021-12-16 RX ADMIN — ALBUTEROL SULFATE 2.5 MG: 2.5 SOLUTION RESPIRATORY (INHALATION) at 12:01

## 2021-12-20 LAB — IGE SERPL-ACNC: 16 IU/ML (ref 6–495)

## 2021-12-22 ENCOUNTER — TELEPHONE (OUTPATIENT)
Dept: PULMONOLOGY | Facility: CLINIC | Age: 46
End: 2021-12-22

## 2021-12-22 ENCOUNTER — DOCUMENTATION (OUTPATIENT)
Dept: PULMONOLOGY | Facility: CLINIC | Age: 46
End: 2021-12-22

## 2021-12-22 NOTE — TELEPHONE ENCOUNTER
----- Message from ARMANDO Garcia sent at 12/22/2021 11:41 AM EST -----  Can you let her know that her PFT, CT Chest and lab work were normal.  She can continue to take the Breo inhaler.  ----- Message -----  From: Interface, Rad Results [place] In  Sent: 12/16/2021  11:10 AM EST  To: ARMANDO Garcia

## 2022-01-24 ENCOUNTER — TRANSCRIBE ORDERS (OUTPATIENT)
Dept: LAB | Facility: HOSPITAL | Age: 47
End: 2022-01-24

## 2022-01-24 DIAGNOSIS — Z01.818 OTHER SPECIFIED PRE-OPERATIVE EXAMINATION: Primary | ICD-10-CM

## 2022-01-25 ENCOUNTER — LAB (OUTPATIENT)
Dept: LAB | Facility: HOSPITAL | Age: 47
End: 2022-01-25

## 2022-01-25 DIAGNOSIS — Z01.818 OTHER SPECIFIED PRE-OPERATIVE EXAMINATION: ICD-10-CM

## 2022-01-25 LAB — SARS-COV-2 RNA PNL SPEC NAA+PROBE: NOT DETECTED

## 2022-01-25 PROCEDURE — U0004 COV-19 TEST NON-CDC HGH THRU: HCPCS | Performed by: SURGERY

## 2022-01-25 PROCEDURE — C9803 HOPD COVID-19 SPEC COLLECT: HCPCS

## 2022-08-27 ENCOUNTER — HOSPITAL ENCOUNTER (EMERGENCY)
Dept: HOSPITAL 79 - ER1 | Age: 47
LOS: 1 days | Discharge: HOME | End: 2022-08-28
Payer: COMMERCIAL

## 2022-08-27 DIAGNOSIS — R10.9: Primary | ICD-10-CM

## 2022-08-27 DIAGNOSIS — Z87.442: ICD-10-CM

## 2022-08-27 DIAGNOSIS — R10.819: ICD-10-CM

## 2022-08-27 DIAGNOSIS — I10: ICD-10-CM

## 2022-08-27 DIAGNOSIS — Z90.89: ICD-10-CM

## 2022-08-27 LAB
BUN/CREATININE RATIO: 21 (ref 0–10)
HGB BLD-MCNC: 14 GM/DL (ref 12.3–15.3)
RED BLOOD COUNT: 4.73 M/UL (ref 4–5.1)
WHITE BLOOD COUNT: 10.8 K/UL (ref 4.5–11)

## 2022-08-30 ENCOUNTER — OFFICE VISIT (OUTPATIENT)
Dept: GASTROENTEROLOGY | Facility: CLINIC | Age: 47
End: 2022-08-30

## 2022-08-30 VITALS
HEIGHT: 66 IN | HEART RATE: 61 BPM | DIASTOLIC BLOOD PRESSURE: 76 MMHG | WEIGHT: 202 LBS | OXYGEN SATURATION: 97 % | BODY MASS INDEX: 32.47 KG/M2 | SYSTOLIC BLOOD PRESSURE: 119 MMHG

## 2022-08-30 DIAGNOSIS — R13.19 ESOPHAGEAL DYSPHAGIA: Primary | ICD-10-CM

## 2022-08-30 PROCEDURE — 99214 OFFICE O/P EST MOD 30 MIN: CPT | Performed by: PHYSICIAN ASSISTANT

## 2022-08-30 NOTE — PROGRESS NOTES
"Chief Complaint   Patient presents with   • Difficulty Swallowing       Marisol Overton is a 46 y.o. female who presents to the office today for evaluation of Difficulty Swallowing    HPI  The patient presents today with difficulty swallowing. The patient is accompanied by an adult female.    She states she has had her esophagus stretched in the past. She reports that she is experiencing difficulty with solid food not going down well, especially meat and apples blocking her airway off. She cannot pass the food by drinking liquids. She notes she must put her finger in her throat and dig the food out. She reports she \"gets strangled\" with liquids. She adds that she even gets \"strangled\" on her own saliva. She denies heartburn. She does not take any medication for heartburn or reflux.    She adds that she underwent a thyroid ultrasound revealing thyroid nodules. She underwent a colonoscopy about 6 months ago with Dr. Zavala. Dr. Howard performed an esophageal dilation in 2020.     She states at night she coughs and feels \"like there is cotton in my throat.\"  Review of Systems   HENT: Negative for sore throat and trouble swallowing.    Eyes: Negative.    Respiratory: Negative for chest tightness.    Cardiovascular: Negative for chest pain.   Gastrointestinal: Positive for abdominal distention, abdominal pain, anal bleeding, blood in stool, constipation, diarrhea, nausea and vomiting. Negative for rectal pain.   Endocrine: Negative.    Genitourinary: Negative for difficulty urinating and hematuria.   Musculoskeletal: Positive for back pain and neck pain.   Skin: Negative.    Allergic/Immunologic: Negative.  Negative for environmental allergies and food allergies.   Neurological: Positive for dizziness and headaches. Negative for seizures.   Hematological: Does not bruise/bleed easily.   Psychiatric/Behavioral: Positive for sleep disturbance.       ACTIVE PROBLEMS:   Specialty Problems        Gastroenterology Problems "    Esophageal dysphagia              PAST MEDICAL HISTORY:  Past Medical History:   Diagnosis Date   • Arthritis    • Asthma    • Elevated cholesterol    • BUCK (obstructive sleep apnea) 2021   • Ovarian cancer (HCC)    • PONV (postoperative nausea and vomiting)        SURGICAL HISTORY:  Past Surgical History:   Procedure Laterality Date   • APPENDECTOMY     •  SECTION      X 4   • CHOLECYSTECTOMY     • CYST REMOVAL Left     NECK   • ENDOSCOPY N/A 2020    Procedure: ESOPHAGOGASTRODUODENOSCOPY WITH DILATATION CPT CODE: 51059;  Surgeon: Jose Roberto Howard MD;  Location: [facility];  Service: Gastroenterology;  Laterality: N/A;   • OOPHORECTOMY      UNKNOWN PER PT       FAMILY HISTORY:  Family History   Problem Relation Age of Onset   • Breast cancer Paternal Grandmother    • Lung cancer Maternal Grandmother        SOCIAL HISTORY:  Social History     Tobacco Use   • Smoking status: Never Smoker   • Smokeless tobacco: Never Used   Substance Use Topics   • Alcohol use: Never       CURRENT MEDICATION:    Current Outpatient Medications:   •  albuterol (PROVENTIL) (2.5 MG/3ML) 0.083% nebulizer solution, Take As Directed., Disp: , Rfl:   •  celecoxib (CeleBREX) 200 MG capsule, Take 200 mg by mouth 2 (Two) Times a Day., Disp: , Rfl:   •  cyanocobalamin 1000 MCG/ML injection, INJECT 1 ML INTRAMUSCULARLY EVERY TWO WEEKS, Disp: , Rfl:   •  EPINEPHrine (EPIPEN IJ), Inject  as directed Take As Directed., Disp: , Rfl:   •  escitalopram (LEXAPRO) 10 MG tablet, Take 10 mg by mouth Every Morning., Disp: , Rfl:   •  Fluticasone Furoate-Vilanterol (BREO ELLIPTA) 200-25 MCG/INH inhaler, Inhale 1 puff Daily., Disp: 60 each, Rfl: 5  •  gabapentin (NEURONTIN) 800 MG tablet, Take 800 mg by mouth 3 (Three) Times a Day., Disp: , Rfl:   •  hydrOXYzine pamoate (VISTARIL) 25 MG capsule, Take 25 mg by mouth 3 (Three) Times a Day As Needed for Itching., Disp: , Rfl:   •  ibuprofen (ADVIL,MOTRIN) 800 MG tablet, Take 800  "mg by mouth Every 6 (Six) Hours As Needed for Mild Pain ., Disp: , Rfl:   •  metFORMIN ER (GLUCOPHAGE-XR) 500 MG 24 hr tablet, Take 500 mg by mouth Daily., Disp: , Rfl:   •  SUMAtriptan (IMITREX) 100 MG tablet, TAKE 1 TABLET BY MOUTH ONCE DAILY AS NEEDED FOR MIGRAINE HEADACHE, Disp: , Rfl:   •  topiramate (TOPAMAX) 25 MG tablet, Take 25 mg by mouth 2 (Two) Times a Day., Disp: , Rfl:   •  traMADol (ULTRAM) 50 MG tablet, Take 50 mg by mouth Daily., Disp: , Rfl:   •  traZODone (DESYREL) 150 MG tablet, Take 150 mg by mouth At Night As Needed. for sleep, Disp: , Rfl:   •  Ventolin  (90 Base) MCG/ACT inhaler, Inhale 2 puffs Every 4 (Four) Hours As Needed for Wheezing., Disp: 18 g, Rfl: 3  •  busPIRone (BUSPAR) 10 MG tablet, Take 10 mg by mouth 3 (Three) Times a Day., Disp: , Rfl:     ALLERGIES:  Shellfish-derived products, Other, and Watermelon flavor    VISIT VITALS:  /76   Pulse 61   Ht 167.6 cm (66\")   Wt 91.6 kg (202 lb)   SpO2 97%   BMI 32.60 kg/m²   Physical Exam  Constitutional:       General: She is not in acute distress.     Appearance: Normal appearance. She is well-developed.   HENT:      Head: Normocephalic and atraumatic.      Mouth/Throat:      Comments: Patient was pointing to cervical esophagus in regards to where food is getting lodged.  Eyes:      Pupils: Pupils are equal, round, and reactive to light.   Cardiovascular:      Rate and Rhythm: Normal rate and regular rhythm.      Heart sounds: Normal heart sounds.   Pulmonary:      Effort: Pulmonary effort is normal. No respiratory distress.      Breath sounds: Normal breath sounds. No wheezing, rhonchi or rales.   Abdominal:      General: Abdomen is flat. Bowel sounds are normal. There is no distension.      Palpations: Abdomen is soft. There is no mass.      Tenderness: There is no abdominal tenderness. There is no guarding or rebound.      Hernia: No hernia is present.   Musculoskeletal:         General: No swelling. Normal range of " motion.      Cervical back: Normal range of motion and neck supple.      Right lower leg: No edema.      Left lower leg: No edema.   Skin:     General: Skin is warm and dry.   Neurological:      Mental Status: She is alert and oriented to person, place, and time.   Psychiatric:         Attention and Perception: Attention normal.         Mood and Affect: Mood normal.         Speech: Speech normal.         Behavior: Behavior normal. Behavior is cooperative.         Thought Content: Thought content normal.           RESULTS:  Thyroid ultrasound revealed several small thyroid nodules.    EGD Pathology: No Hoyos's esophagus.    Assessment       Diagnosis Plan   1. Esophageal dysphagia  Case Request    Case Request   1. Esophageal dysphagia  We discussed that she most likely has an esophageal stricture in cervical esophagus. An order was placed for an EGD. We discussed the procedure and she will be scheduled with either Dr. Howard or Dr. Harp, whoever has the soonest availability.       Return After procedure.               This document has been electronically signed by Pola Carter PA-C  August 30, 2022 11:50 EDT    Part of this note may be an electronic transcription/translation of spoken language to printed text using the Dragon Dictation System.    Transcribed from ambient dictation for Pola Carter PA-C by Tova Tyler.  08/30/22   10:08 EDT    Patient verbalized consent to the visit recording.

## 2022-09-07 ENCOUNTER — HOSPITAL ENCOUNTER (OUTPATIENT)
Dept: MAMMOGRAPHY | Facility: HOSPITAL | Age: 47
Discharge: HOME | End: 2022-09-07

## 2022-09-07 ENCOUNTER — HOSPITAL ENCOUNTER (OUTPATIENT)
Dept: ULTRASOUND IMAGING | Facility: HOSPITAL | Age: 47
Discharge: HOME | End: 2022-09-07

## 2022-09-07 DIAGNOSIS — N63.10 MASS OF RIGHT BREAST, UNSPECIFIED QUADRANT: ICD-10-CM

## 2022-09-07 PROCEDURE — 77066 DX MAMMO INCL CAD BI: CPT | Performed by: RADIOLOGY

## 2022-09-07 PROCEDURE — 76642 ULTRASOUND BREAST LIMITED: CPT | Performed by: RADIOLOGY

## 2022-09-07 PROCEDURE — 77066 DX MAMMO INCL CAD BI: CPT

## 2022-09-07 PROCEDURE — 77062 BREAST TOMOSYNTHESIS BI: CPT | Performed by: RADIOLOGY

## 2022-09-07 PROCEDURE — 76642 ULTRASOUND BREAST LIMITED: CPT

## 2022-09-07 PROCEDURE — G0279 TOMOSYNTHESIS, MAMMO: HCPCS

## 2022-09-19 ENCOUNTER — HOSPITAL ENCOUNTER (OUTPATIENT)
Facility: HOSPITAL | Age: 47
Setting detail: HOSPITAL OUTPATIENT SURGERY
Discharge: HOME | End: 2022-09-19
Attending: INTERNAL MEDICINE | Admitting: INTERNAL MEDICINE

## 2022-09-19 ENCOUNTER — ANESTHESIA EVENT (OUTPATIENT)
Dept: PERIOP | Facility: HOSPITAL | Age: 47
End: 2022-09-19

## 2022-09-19 ENCOUNTER — ANESTHESIA (OUTPATIENT)
Dept: PERIOP | Facility: HOSPITAL | Age: 47
End: 2022-09-19

## 2022-09-19 VITALS
HEIGHT: 66 IN | TEMPERATURE: 98.2 F | SYSTOLIC BLOOD PRESSURE: 117 MMHG | WEIGHT: 210 LBS | HEART RATE: 68 BPM | OXYGEN SATURATION: 96 % | RESPIRATION RATE: 18 BRPM | DIASTOLIC BLOOD PRESSURE: 72 MMHG | BODY MASS INDEX: 33.75 KG/M2

## 2022-09-19 DIAGNOSIS — R13.19 ESOPHAGEAL DYSPHAGIA: ICD-10-CM

## 2022-09-19 LAB
B-HCG UR QL: NEGATIVE
EXPIRATION DATE: NORMAL
GLUCOSE BLDC GLUCOMTR-MCNC: 88 MG/DL (ref 70–130)
INTERNAL NEGATIVE CONTROL: NEGATIVE
INTERNAL POSITIVE CONTROL: POSITIVE
Lab: NORMAL

## 2022-09-19 PROCEDURE — 43239 EGD BIOPSY SINGLE/MULTIPLE: CPT | Performed by: INTERNAL MEDICINE

## 2022-09-19 PROCEDURE — C1726 CATH, BAL DIL, NON-VASCULAR: HCPCS | Performed by: INTERNAL MEDICINE

## 2022-09-19 PROCEDURE — 81025 URINE PREGNANCY TEST: CPT | Performed by: ANESTHESIOLOGY

## 2022-09-19 PROCEDURE — 25010000002 FENTANYL CITRATE (PF) 50 MCG/ML SOLUTION: Performed by: NURSE ANESTHETIST, CERTIFIED REGISTERED

## 2022-09-19 PROCEDURE — 25010000002 PROPOFOL 10 MG/ML EMULSION: Performed by: NURSE ANESTHETIST, CERTIFIED REGISTERED

## 2022-09-19 PROCEDURE — 82962 GLUCOSE BLOOD TEST: CPT

## 2022-09-19 PROCEDURE — 43249 ESOPH EGD DILATION <30 MM: CPT | Performed by: INTERNAL MEDICINE

## 2022-09-19 RX ORDER — SODIUM CHLORIDE 0.9 % (FLUSH) 0.9 %
10 SYRINGE (ML) INJECTION AS NEEDED
Status: DISCONTINUED | OUTPATIENT
Start: 2022-09-19 | End: 2022-09-19 | Stop reason: HOSPADM

## 2022-09-19 RX ORDER — FENTANYL CITRATE 50 UG/ML
INJECTION, SOLUTION INTRAMUSCULAR; INTRAVENOUS AS NEEDED
Status: DISCONTINUED | OUTPATIENT
Start: 2022-09-19 | End: 2022-09-19 | Stop reason: SURG

## 2022-09-19 RX ORDER — ONDANSETRON 2 MG/ML
4 INJECTION INTRAMUSCULAR; INTRAVENOUS AS NEEDED
Status: DISCONTINUED | OUTPATIENT
Start: 2022-09-19 | End: 2022-09-19 | Stop reason: HOSPADM

## 2022-09-19 RX ORDER — SODIUM CHLORIDE, SODIUM LACTATE, POTASSIUM CHLORIDE, CALCIUM CHLORIDE 600; 310; 30; 20 MG/100ML; MG/100ML; MG/100ML; MG/100ML
125 INJECTION, SOLUTION INTRAVENOUS ONCE
Status: COMPLETED | OUTPATIENT
Start: 2022-09-19 | End: 2022-09-19

## 2022-09-19 RX ORDER — IPRATROPIUM BROMIDE AND ALBUTEROL SULFATE 2.5; .5 MG/3ML; MG/3ML
3 SOLUTION RESPIRATORY (INHALATION) ONCE AS NEEDED
Status: DISCONTINUED | OUTPATIENT
Start: 2022-09-19 | End: 2022-09-19 | Stop reason: HOSPADM

## 2022-09-19 RX ORDER — LIDOCAINE HYDROCHLORIDE 20 MG/ML
INJECTION, SOLUTION INFILTRATION; PERINEURAL AS NEEDED
Status: DISCONTINUED | OUTPATIENT
Start: 2022-09-19 | End: 2022-09-19 | Stop reason: SURG

## 2022-09-19 RX ORDER — PROPOFOL 10 MG/ML
VIAL (ML) INTRAVENOUS AS NEEDED
Status: DISCONTINUED | OUTPATIENT
Start: 2022-09-19 | End: 2022-09-19 | Stop reason: SURG

## 2022-09-19 RX ORDER — SODIUM CHLORIDE 0.9 % (FLUSH) 0.9 %
10 SYRINGE (ML) INJECTION EVERY 12 HOURS SCHEDULED
Status: DISCONTINUED | OUTPATIENT
Start: 2022-09-19 | End: 2022-09-19 | Stop reason: HOSPADM

## 2022-09-19 RX ORDER — SODIUM CHLORIDE, SODIUM LACTATE, POTASSIUM CHLORIDE, CALCIUM CHLORIDE 600; 310; 30; 20 MG/100ML; MG/100ML; MG/100ML; MG/100ML
100 INJECTION, SOLUTION INTRAVENOUS ONCE AS NEEDED
Status: DISCONTINUED | OUTPATIENT
Start: 2022-09-19 | End: 2022-09-19 | Stop reason: HOSPADM

## 2022-09-19 RX ORDER — MIDAZOLAM HYDROCHLORIDE 1 MG/ML
1 INJECTION INTRAMUSCULAR; INTRAVENOUS
Status: DISCONTINUED | OUTPATIENT
Start: 2022-09-19 | End: 2022-09-19 | Stop reason: HOSPADM

## 2022-09-19 RX ORDER — FENTANYL CITRATE 50 UG/ML
50 INJECTION, SOLUTION INTRAMUSCULAR; INTRAVENOUS
Status: DISCONTINUED | OUTPATIENT
Start: 2022-09-19 | End: 2022-09-19 | Stop reason: HOSPADM

## 2022-09-19 RX ADMIN — PROPOFOL 200 MG: 10 INJECTION, EMULSION INTRAVENOUS at 08:20

## 2022-09-19 RX ADMIN — SODIUM CHLORIDE, POTASSIUM CHLORIDE, SODIUM LACTATE AND CALCIUM CHLORIDE: 600; 310; 30; 20 INJECTION, SOLUTION INTRAVENOUS at 08:17

## 2022-09-19 RX ADMIN — LIDOCAINE HYDROCHLORIDE 100 MG: 20 INJECTION, SOLUTION INFILTRATION; PERINEURAL at 08:17

## 2022-09-19 RX ADMIN — FENTANYL CITRATE 100 MCG: 50 INJECTION INTRAMUSCULAR; INTRAVENOUS at 08:17

## 2022-09-19 NOTE — INTERVAL H&P NOTE
Patient with history of esophageal stricture requiring esophageal dilation in the past presents again with intermittent dysphagia to solids  H&P reviewed. The patient was examined and there are no changes to the H&P.

## 2022-09-19 NOTE — ANESTHESIA PREPROCEDURE EVALUATION
Anesthesia Evaluation     Patient summary reviewed and Nursing notes reviewed   history of anesthetic complications: PONV  NPO Solid Status: > 8 hours             Airway   Mallampati: II  TM distance: >3 FB  Neck ROM: full  No difficulty expected  Dental - normal exam     Pulmonary - normal exam   (+) asthma,shortness of breath, sleep apnea,   Cardiovascular - normal exam  Exercise tolerance: good (4-7 METS)    NYHA Classification: II    (+) hypertension, hyperlipidemia,       Neuro/Psych- negative ROS  GI/Hepatic/Renal/Endo    (+)   diabetes mellitus,     Musculoskeletal     Abdominal  - normal exam   Substance History - negative use     OB/GYN negative ob/gyn ROS         Other   arthritis,    history of cancer remission                      Anesthesia Plan    ASA 2     general     intravenous induction     Anesthetic plan, risks, benefits, and alternatives have been provided, discussed and informed consent has been obtained with: patient.

## 2022-09-19 NOTE — OP NOTE
EGD report summary  See EGD report for details    Peptic esophageal stricture noted at the EG junction biopsied extensively to disrupt the ring and then dilated to 20 mm with a through-the-scope balloon which was then utilized to dilate proximal and distal esophagus, upper/lower esophageal sphincters.  Small sliding hiatal hernia was noted otherwise stomach was normal as was duodenum.    Impression  Status post esophageal dilation of peptic esophageal stricture    Recommendation  Redilate as needed  Await pathology report

## 2022-09-19 NOTE — ANESTHESIA POSTPROCEDURE EVALUATION
Patient: Marisol Overton    Procedure Summary     Date: 09/19/22 Room / Location: [facility] OR  /  [facility] OR    Anesthesia Start: 0817 Anesthesia Stop: 0829    Procedure: ESOPHAGOGASTRODUODENOSCOPY WITH BIOPSY AND DILATATION (N/A Esophagus) Diagnosis:       Esophageal dysphagia      (Esophageal dysphagia [R13.19])    Surgeons: Jose Roberto Howard MD Provider: Dayday Mabry DO    Anesthesia Type: general ASA Status: 2          Anesthesia Type: general    Vitals  Vitals Value Taken Time   /72 09/19/22 0859   Temp 98.2 °F (36.8 °C) 09/19/22 0829   Pulse 68 09/19/22 0859   Resp 18 09/19/22 0859   SpO2 96 % 09/19/22 0859           Post Anesthesia Care and Evaluation    Patient location during evaluation: PHASE II  Patient participation: complete - patient participated  Level of consciousness: awake and alert  Pain score: 1  Pain management: adequate    Airway patency: patent  Anesthetic complications: No anesthetic complications  PONV Status: controlled  Cardiovascular status: acceptable  Respiratory status: acceptable  Hydration status: acceptable      
Warm

## 2022-09-20 LAB — REF LAB TEST METHOD: NORMAL

## 2022-10-14 ENCOUNTER — OFFICE VISIT (OUTPATIENT)
Dept: SURGERY | Facility: CLINIC | Age: 47
End: 2022-10-14

## 2022-10-14 VITALS — DIASTOLIC BLOOD PRESSURE: 84 MMHG | HEART RATE: 52 BPM | SYSTOLIC BLOOD PRESSURE: 136 MMHG

## 2022-10-14 DIAGNOSIS — N60.09 CYST OF BREAST, UNSPECIFIED LATERALITY: Primary | ICD-10-CM

## 2022-10-14 DIAGNOSIS — N60.01 CYST (SOLITARY) OF BREAST, RIGHT: ICD-10-CM

## 2022-10-14 DIAGNOSIS — N60.01 CYST (SOLITARY) OF BREAST, RIGHT: Primary | ICD-10-CM

## 2022-10-14 PROCEDURE — 87070 CULTURE OTHR SPECIMN AEROBIC: CPT | Performed by: SURGERY

## 2022-10-14 PROCEDURE — 19001 PUNCTURE ASPIR CYST BRST EA: CPT | Performed by: SURGERY

## 2022-10-14 PROCEDURE — 99203 OFFICE O/P NEW LOW 30 MIN: CPT | Performed by: SURGERY

## 2022-10-14 PROCEDURE — 87205 SMEAR GRAM STAIN: CPT | Performed by: SURGERY

## 2022-10-14 PROCEDURE — 76942 ECHO GUIDE FOR BIOPSY: CPT | Performed by: SURGERY

## 2022-10-14 PROCEDURE — 19000 PUNCTURE ASPIR CYST BREAST: CPT | Performed by: SURGERY

## 2022-10-14 RX ORDER — METOPROLOL SUCCINATE 25 MG/1
TABLET, EXTENDED RELEASE ORAL
COMMUNITY
Start: 2022-08-18

## 2022-10-14 RX ORDER — LOSARTAN POTASSIUM 25 MG/1
25 TABLET ORAL DAILY
COMMUNITY
Start: 2022-08-18

## 2022-10-14 NOTE — PROGRESS NOTES
Subjective   Marisol Overton is a 46 y.o. female here today for possible right breast cyst.  History of Present Illness  Ms. Overton was seen in the office today for asymptomatic breast cyst on the right.  She also has a palpable cyst on the left.  She underwent a bilateral diagnostic mammogram with tomosynthesis and right breast ultrasound on 9/7/2022.  This demonstrated multiple masses and on ultrasound demonstrated several cysts with the palpable abnormality corresponding to a 4 cm cyst.  Patient denies a prior history of breast biopsy or cyst aspiration.  She does have a personal history of ovarian cancer which she states was detected while she was pregnant.  She believes she still has an ovary remaining.  Family history is positive for breast cancer in the grandmother and a maternal grandmother.  Allergies   Allergen Reactions   • Bee Venom Anaphylaxis   • Shellfish-Derived Products Shortness Of Breath and Itching   • Other Other (See Comments)     PT REPORTS ALLERGY TEST SHOW POSITIVE ALLERGY-STATES THAT MEAT PRODUCTS GETS STUCK IN THROAT BUT NO REACTION TO MEAT PRODUCTS-STATES SHE STILL EATS THEM   • Watermelon Flavor Rash     Current Outpatient Medications   Medication Sig Dispense Refill   • albuterol (PROVENTIL) (2.5 MG/3ML) 0.083% nebulizer solution Take As Directed.     • celecoxib (CeleBREX) 200 MG capsule Take 200 mg by mouth 2 (Two) Times a Day.     • cyanocobalamin 1000 MCG/ML injection INJECT 1 ML INTRAMUSCULARLY EVERY TWO WEEKS     • EPINEPHrine (EPIPEN IJ) Inject  as directed Take As Directed.     • Fluticasone Furoate-Vilanterol (BREO ELLIPTA) 200-25 MCG/INH inhaler Inhale 1 puff Daily. 60 each 5   • gabapentin (NEURONTIN) 800 MG tablet Take 800 mg by mouth 3 (Three) Times a Day.     • metFORMIN ER (GLUCOPHAGE-XR) 500 MG 24 hr tablet Take 500 mg by mouth Daily.     • metoprolol succinate XL (TOPROL-XL) 25 MG 24 hr tablet TAKE 1 TABLET BY MOUTH ONCE A DAY HEART AND BLOOD PRESSURE     • traMADol  (ULTRAM) 50 MG tablet Take 50 mg by mouth Daily.     • losartan (COZAAR) 25 MG tablet Take 1 tablet by mouth Daily.     • Ventolin  (90 Base) MCG/ACT inhaler Inhale 2 puffs Every 4 (Four) Hours As Needed for Wheezing. 18 g 3     No current facility-administered medications for this visit.     Past Medical History:   Diagnosis Date   • Arthritis    • Asthma    • Diabetes mellitus (HCC)    • Elevated cholesterol    • Hypertension    • BUCK (obstructive sleep apnea) 2021   • Ovarian cancer (HCC)    • PONV (postoperative nausea and vomiting)      Past Surgical History:   Procedure Laterality Date   • APPENDECTOMY     •  SECTION      X 4   • CHOLECYSTECTOMY     • COLONOSCOPY     • CYST REMOVAL Left     NECK   • ENDOSCOPY N/A 2020    Procedure: ESOPHAGOGASTRODUODENOSCOPY WITH DILATATION CPT CODE: 57397;  Surgeon: Jose Roberto Howard MD;  Location: [facility];  Service: Gastroenterology;  Laterality: N/A;   • ENDOSCOPY N/A 2022    Procedure: ESOPHAGOGASTRODUODENOSCOPY WITH BIOPSY AND DILATATION;  Surgeon: Jose Roberto Howard MD;  Location: [facility];  Service: Gastroenterology;  Laterality: N/A;  DILATED ESOPHAGUS TO 20 MM   • HEMORRHOIDECTOMY     • OOPHORECTOMY      UNKNOWN PER PT       Pertinent Review of Systems:  Respiratory: no shortness of breath  Cardiovascular: no chest pain  Other pertinent:      Objective   /84 (BP Location: Left arm)   Pulse 52   Physical Exam  Well-developed well-nourished female  Neck:  No supraclavicular adenopathy  Lungs:  Respiratory effort normal. Auscultation: Clear, without wheezes, rhonchi, rales  Heart:  Regular rate and rhythm, without murmur, gallop, rub.  No pedal edema  Breasts: On visual inspection the breasts are symmetrical.  Examination of the right breast demonstrates dense tissue.  There is a fullness in the lateral 10 o'clock position of the right breast corresponding to the area of concern.  There is no axillary  adenopathy..  Examination of the left breast demonstrates dense tissue with a palpable mass consistent with a cyst in the lateral 2 o'clock position..       Procedures   Ultrasound Procedure Note    Ultrasound guided cyst aspiration:    The risks and benefits of the procedure were discussed and consent was obtained.  With use of 12.5 MHz transducer, the area of concern was identified in the right breast at 10:00.  With the ultrasound probe overlying the lesion the skin was prepped with alcohol and anesthetized with 1% lidocaine with epinephrine.  A 21 gauge needle was passed to the lesion under ultrasound guidance.  7 cc of very thick white fluid fluid was obtained.  Post-aspiration films were obtained.  A dressing was placed.    Impression: Right breast cyst    Recommendation: As the fluid was extremely thick and almost appeared purulent and was tender a fluid was sent for culture and results will be tracked      Ultrasound Procedure Note    Ultrasound guided cyst aspiration:    The risks and benefits of the procedure were discussed and consent was obtained.  With use of 12.5 MHz transducer, the area of concern was identified in the left breast at 2:00.  With the ultrasound probe overlying the lesion the skin was prepped with alcohol and anesthetized with 1% lidocaine with epinephrine.  A 21 gauge needle was passed to the lesion under ultrasound guidance.  8 cc of fluid was obtained.  Post-aspiration films were obtained.  A dressing was placed.    Impression:    Recommendation:  Results/Data:  Imaging: Mammogram and ultrasound reports and images from 9/7/2022 were reviewed.  I agree with the assessment      Assessment & Plan   Right breast cyst, symptomatic.  Based upon the appearance fluid was sent for culture  Symptomatic left breast cyst, resolved post aspiration    Culture result will be tracked.  Patient may resume routine breast surveillance       Discussion/Summary: Possibility of cyst recurrence was  discussed    Time spent:     BMI is >= 30 and <35. (Class 1 Obesity). The following options were offered after discussion;: exercise counseling/recommendations       No future appointments.      Please note that portions of this note were completed with a voice recognition program.

## 2022-10-15 PROBLEM — N60.09 CYST OF BREAST: Status: ACTIVE | Noted: 2022-10-15

## 2022-10-17 LAB
BACTERIA SPEC AEROBE CULT: NORMAL
GRAM STN SPEC: NORMAL
GRAM STN SPEC: NORMAL

## 2022-10-19 ENCOUNTER — TELEPHONE (OUTPATIENT)
Dept: SURGERY | Facility: CLINIC | Age: 47
End: 2022-10-19

## 2022-10-19 NOTE — TELEPHONE ENCOUNTER
Marisol called and because report was normal I let her know No Organisms were seen and a few white blood cells.

## 2023-03-29 ENCOUNTER — APPOINTMENT (OUTPATIENT)
Dept: GENERAL RADIOLOGY | Age: 48
End: 2023-03-29
Attending: STUDENT IN AN ORGANIZED HEALTH CARE EDUCATION/TRAINING PROGRAM
Payer: COMMERCIAL

## 2023-03-29 ENCOUNTER — HOSPITAL ENCOUNTER (EMERGENCY)
Age: 48
Discharge: HOME | End: 2023-03-29
Attending: STUDENT IN AN ORGANIZED HEALTH CARE EDUCATION/TRAINING PROGRAM
Payer: COMMERCIAL

## 2023-03-29 VITALS
DIASTOLIC BLOOD PRESSURE: 107 MMHG | SYSTOLIC BLOOD PRESSURE: 144 MMHG | RESPIRATION RATE: 18 BRPM | TEMPERATURE: 98.4 F | HEART RATE: 75 BPM | HEIGHT: 69 IN | WEIGHT: 245.15 LBS | BODY MASS INDEX: 36.31 KG/M2 | OXYGEN SATURATION: 95 %

## 2023-03-29 DIAGNOSIS — R07.9 CHEST PAIN, UNSPECIFIED TYPE: Primary | ICD-10-CM

## 2023-03-29 LAB
ALBUMIN SERPL-MCNC: 3.7 G/DL (ref 3.5–5)
ALBUMIN/GLOB SERPL: 0.9 (ref 1.1–2.2)
ALP SERPL-CCNC: 93 U/L (ref 45–117)
ALT SERPL-CCNC: 22 U/L (ref 12–78)
ANION GAP SERPL CALC-SCNC: 4 MMOL/L (ref 5–15)
AST SERPL-CCNC: 21 U/L (ref 15–37)
ATRIAL RATE: 73 BPM
BASOPHILS # BLD: 0 K/UL (ref 0–0.1)
BASOPHILS NFR BLD: 0 % (ref 0–1)
BILIRUB SERPL-MCNC: 0.3 MG/DL (ref 0.2–1)
BNP SERPL-MCNC: 37 PG/ML
BUN SERPL-MCNC: 15 MG/DL (ref 6–20)
BUN/CREAT SERPL: 21 (ref 12–20)
CALCIUM SERPL-MCNC: 9.1 MG/DL (ref 8.5–10.1)
CALCULATED P AXIS, ECG09: 39 DEGREES
CALCULATED R AXIS, ECG10: 40 DEGREES
CALCULATED T AXIS, ECG11: 55 DEGREES
CHLORIDE SERPL-SCNC: 108 MMOL/L (ref 97–108)
CO2 SERPL-SCNC: 25 MMOL/L (ref 21–32)
COMMENT, HOLDF: NORMAL
CREAT SERPL-MCNC: 0.71 MG/DL (ref 0.55–1.02)
D DIMER PPP FEU-MCNC: 0.51 MG/L FEU (ref 0–0.65)
DIAGNOSIS, 93000: NORMAL
DIFFERENTIAL METHOD BLD: ABNORMAL
EOSINOPHIL # BLD: 0.1 K/UL (ref 0–0.4)
EOSINOPHIL NFR BLD: 1 % (ref 0–7)
ERYTHROCYTE [DISTWIDTH] IN BLOOD BY AUTOMATED COUNT: 13.4 % (ref 11.5–14.5)
GLOBULIN SER CALC-MCNC: 4 G/DL (ref 2–4)
GLUCOSE SERPL-MCNC: 84 MG/DL (ref 65–100)
HCG SERPL QL: NEGATIVE
HCT VFR BLD AUTO: 44.2 % (ref 35–47)
HGB BLD-MCNC: 14.3 G/DL (ref 11.5–16)
IMM GRANULOCYTES # BLD AUTO: 0 K/UL (ref 0–0.04)
IMM GRANULOCYTES NFR BLD AUTO: 0 % (ref 0–0.5)
LYMPHOCYTES # BLD: 2.7 K/UL (ref 0.8–3.5)
LYMPHOCYTES NFR BLD: 38 % (ref 12–49)
MCH RBC QN AUTO: 26.6 PG (ref 26–34)
MCHC RBC AUTO-ENTMCNC: 32.4 G/DL (ref 30–36.5)
MCV RBC AUTO: 82.2 FL (ref 80–99)
MONOCYTES # BLD: 0.6 K/UL (ref 0–1)
MONOCYTES NFR BLD: 9 % (ref 5–13)
NEUTS SEG # BLD: 3.7 K/UL (ref 1.8–8)
NEUTS SEG NFR BLD: 52 % (ref 32–75)
NRBC # BLD: 0 K/UL (ref 0–0.01)
NRBC BLD-RTO: 0 PER 100 WBC
P-R INTERVAL, ECG05: 148 MS
PLATELET # BLD AUTO: 257 K/UL (ref 150–400)
PMV BLD AUTO: 10.2 FL (ref 8.9–12.9)
POTASSIUM SERPL-SCNC: 3.6 MMOL/L (ref 3.5–5.1)
PROT SERPL-MCNC: 7.7 G/DL (ref 6.4–8.2)
Q-T INTERVAL, ECG07: 406 MS
QRS DURATION, ECG06: 94 MS
QTC CALCULATION (BEZET), ECG08: 447 MS
RBC # BLD AUTO: 5.38 M/UL (ref 3.8–5.2)
SAMPLES BEING HELD,HOLD: NORMAL
SODIUM SERPL-SCNC: 137 MMOL/L (ref 136–145)
TROPONIN I SERPL HS-MCNC: 3 NG/L (ref 0–37)
VENTRICULAR RATE, ECG03: 73 BPM
WBC # BLD AUTO: 7.2 K/UL (ref 3.6–11)

## 2023-03-29 PROCEDURE — 93005 ELECTROCARDIOGRAM TRACING: CPT

## 2023-03-29 PROCEDURE — 36415 COLL VENOUS BLD VENIPUNCTURE: CPT

## 2023-03-29 PROCEDURE — 85379 FIBRIN DEGRADATION QUANT: CPT

## 2023-03-29 PROCEDURE — 85025 COMPLETE CBC W/AUTO DIFF WBC: CPT

## 2023-03-29 PROCEDURE — 74011250637 HC RX REV CODE- 250/637: Performed by: STUDENT IN AN ORGANIZED HEALTH CARE EDUCATION/TRAINING PROGRAM

## 2023-03-29 PROCEDURE — 96374 THER/PROPH/DIAG INJ IV PUSH: CPT

## 2023-03-29 PROCEDURE — 84703 CHORIONIC GONADOTROPIN ASSAY: CPT

## 2023-03-29 PROCEDURE — 99285 EMERGENCY DEPT VISIT HI MDM: CPT

## 2023-03-29 PROCEDURE — 83880 ASSAY OF NATRIURETIC PEPTIDE: CPT

## 2023-03-29 PROCEDURE — 74011250636 HC RX REV CODE- 250/636: Performed by: STUDENT IN AN ORGANIZED HEALTH CARE EDUCATION/TRAINING PROGRAM

## 2023-03-29 PROCEDURE — 80053 COMPREHEN METABOLIC PANEL: CPT

## 2023-03-29 PROCEDURE — 84484 ASSAY OF TROPONIN QUANT: CPT

## 2023-03-29 PROCEDURE — 71046 X-RAY EXAM CHEST 2 VIEWS: CPT

## 2023-03-29 RX ORDER — KETOROLAC TROMETHAMINE 10 MG/1
10 TABLET, FILM COATED ORAL
Qty: 12 TABLET | Refills: 0 | Status: SHIPPED | OUTPATIENT
Start: 2023-03-29

## 2023-03-29 RX ORDER — BENZONATATE 100 MG/1
200 CAPSULE ORAL ONCE
Status: COMPLETED | OUTPATIENT
Start: 2023-03-29 | End: 2023-03-29

## 2023-03-29 RX ORDER — KETOROLAC TROMETHAMINE 30 MG/ML
15 INJECTION, SOLUTION INTRAMUSCULAR; INTRAVENOUS
Status: COMPLETED | OUTPATIENT
Start: 2023-03-29 | End: 2023-03-29

## 2023-03-29 RX ORDER — BENZONATATE 100 MG/1
200 CAPSULE ORAL
Qty: 30 CAPSULE | Refills: 0 | Status: SHIPPED | OUTPATIENT
Start: 2023-03-29 | End: 2023-04-12

## 2023-03-29 RX ADMIN — BENZONATATE 200 MG: 100 CAPSULE ORAL at 08:53

## 2023-03-29 RX ADMIN — KETOROLAC TROMETHAMINE 15 MG: 30 INJECTION, SOLUTION INTRAMUSCULAR; INTRAVENOUS at 08:53

## 2023-03-29 NOTE — Clinical Note
[address]  [address] [facility] [address]  [phone]    Work/School Note    Date: 3/29/2023    To Whom It May concern:    Becky Dickson was seen and treated today in the emergency room by the following provider(s):  Attending Provider: Real Garcia MD.      Becky Dickson is excused from work/school on 03/29/23 and 03/30/23. She is medically clear to return to work/school on 3/31/2023.        Sincerely,          Gertrudis Garcia MD

## 2023-03-29 NOTE — ED PROVIDER NOTES
73-year-old female presents with chest pain, shortness of breath, cough. Cough started on Friday. Patient has had intermittent numbness of her left arm for years. She has had intermittent chest pains on the left side of her chest also over months. She states that the chest pain has become worse since the coughing and is now constant. She has also started feeling short of breath with coughing. She reports that occasionally she gets a bit of swelling in her legs but it is not severe in general and is not present today. Patient denies abdominal pain, vomiting, diarrhea, fever, congestion. She is not coughing up anything. She was seen at urgent care on Sunday and they told her to follow-up with a cardiologist.  She had COVID and flu test that were negative and then had a chest x-ray which showed tortuous aorta but no other acute abnormalities according to the patient. Symptoms were more severe today which is why she came in. The history is provided by the patient. Chest Pain (Angina)   Associated symptoms include back pain, cough and shortness of breath. Pertinent negatives include no abdominal pain, no fever, no headaches and no vomiting. Shortness of Breath  Associated symptoms include cough and chest pain. Pertinent negatives include no fever, no headaches, no sore throat, no vomiting, no abdominal pain, no rash and no leg swelling. Past Medical History:   Diagnosis Date    Diabetes ([address]     Hypertension        No past surgical history on file. No family history on file.     Social History     Socioeconomic History    Marital status: LEGALLY      Spouse name: Not on file    Number of children: Not on file    Years of education: Not on file    Highest education level: Not on file   Occupational History    Not on file   Tobacco Use    Smoking status: Never    Smokeless tobacco: Not on file   Substance and Sexual Activity    Alcohol use: No    Drug use: Not on file    Sexual activity: Not on file   Other Topics Concern    Not on file   Social History Narrative    Not on file     Social Determinants of Health     Financial Resource Strain: Not on file   Food Insecurity: Not on file   Transportation Needs: Not on file   Physical Activity: Not on file   Stress: Not on file   Social Connections: Not on file   Intimate Partner Violence: Not on file   Housing Stability: Not on file         ALLERGIES: Patient has no known allergies. Review of Systems   Constitutional:  Negative for fever. HENT:  Negative for congestion and sore throat. Respiratory:  Positive for cough and shortness of breath. Cardiovascular:  Positive for chest pain. Negative for leg swelling. Gastrointestinal:  Negative for abdominal pain, diarrhea and vomiting. Genitourinary:  Negative for dysuria. Musculoskeletal:  Positive for back pain. Chronic back pain   Skin:  Negative for rash. Neurological:  Negative for syncope and headaches. Psychiatric/Behavioral:  Negative for confusion. Vitals:    03/29/23 0741   BP: (!) 144/107   Pulse: 75   Resp: 18   Temp: 98.4 °F (36.9 °C)   SpO2: 95%   Weight: 111.2 kg (245 lb 2.4 oz)   Height: 5' 9\" (1.753 m)            Physical Exam  Vitals and nursing note reviewed. Constitutional:       General: She is not in acute distress. Appearance: Normal appearance. She is well-developed. HENT:      Head: Normocephalic and atraumatic. Nose: No congestion. Mouth/Throat:      Mouth: Mucous membranes are moist.   Eyes:      Conjunctiva/sclera: Conjunctivae normal.   Cardiovascular:      Rate and Rhythm: Normal rate and regular rhythm. Pulses: Normal pulses. Heart sounds: Normal heart sounds. No murmur heard. Pulmonary:      Effort: Pulmonary effort is normal.      Breath sounds: Normal breath sounds. Comments: Frequent coughing  Chest:      Chest wall: Tenderness present.       Comments: TTP L upper chest  Abdominal:      General: Bowel sounds are normal.      Palpations: Abdomen is soft. Tenderness: There is no abdominal tenderness. Musculoskeletal:         General: Normal range of motion. Right lower leg: No tenderness. No edema. Left lower leg: No tenderness. No edema. Skin:     General: Skin is warm and dry. Neurological:      General: No focal deficit present. Mental Status: She is alert and oriented to person, place, and time. Psychiatric:         Mood and Affect: Mood normal.         Behavior: Behavior normal.        Medical Decision Making  57-year-old female presents with cough, chest pain, shortness of breath. Patient has chronic back pain and chronic intermittent leg swelling but neither of these are of concern to her today. Work-up reassuring for acute emergent causes of disease including normal D-dimer, normal high-sensitivity troponin. Given the duration of the patient's symptoms, she does not require a second troponin to rule out ACS. Suspect pleuritis/costochondritis particularly given tenderness to palpation of chest wall, worsening of symptoms with with cough. No evidence of pneumonia on chest x-ray. Suspect upper respiratory infection as etiology of cough. Will treat symptomatically and refer to primary care and cardiology for further evaluation. Discussed results with patient who agrees with plan of care. Amount and/or Complexity of Data Reviewed  Labs: ordered. Radiology: ordered. ECG/medicine tests: ordered. Risk  Prescription drug management. Procedures    I personally reviewed and independently interpreted EKG, labs and imaging results. EKG Interpretation   EKG performed at 7:38 AM shows rate of 73, QTc 447, normal sinus rhythm, normal axis, no STEMI.     LABORATORY TESTS:  Admission on 03/29/2023, Discharged on 03/29/2023   Component Date Value Ref Range Status    Ventricular Rate 03/29/2023 73  BPM Final    Atrial Rate 03/29/2023 73  BPM Final    P-R Interval 03/29/2023 148 ms Final    QRS Duration 03/29/2023 94  ms Final    Q-T Interval 03/29/2023 406  ms Final    QTC Calculation (Bezet) 03/29/2023 447  ms Final    Calculated P Axis 03/29/2023 39  degrees Final    Calculated R Axis 03/29/2023 40  degrees Final    Calculated T Axis 03/29/2023 55  degrees Final    Diagnosis 03/29/2023    Final                    Value:Normal sinus rhythm  No previous ECGs available  Confirmed by [place] ([id number]) on 3/29/2023 4:46:36 PM      WBC 03/29/2023 7.2  3.6 - 11.0 K/uL Final    RBC 03/29/2023 5.38 (A)  3.80 - 5.20 M/uL Final    HGB 03/29/2023 14.3  11.5 - 16.0 g/dL Final    HCT 03/29/2023 44.2  35.0 - 47.0 % Final    MCV 03/29/2023 82.2  80.0 - 99.0 FL Final    MCH 03/29/2023 26.6  26.0 - 34.0 PG Final    MCHC 03/29/2023 32.4  30.0 - 36.5 g/dL Final    RDW 03/29/2023 13.4  11.5 - 14.5 % Final    PLATELET 99/90/5294 876  150 - 400 K/uL Final    MPV 03/29/2023 10.2  8.9 - 12.9 FL Final    NRBC 03/29/2023 0.0  0  WBC Final    ABSOLUTE NRBC 03/29/2023 0.00  0.00 - 0.01 K/uL Final    NEUTROPHILS 03/29/2023 52  32 - 75 % Final    LYMPHOCYTES 03/29/2023 38  12 - 49 % Final    MONOCYTES 03/29/2023 9  5 - 13 % Final    EOSINOPHILS 03/29/2023 1  0 - 7 % Final    BASOPHILS 03/29/2023 0  0 - 1 % Final    IMMATURE GRANULOCYTES 03/29/2023 0  0.0 - 0.5 % Final    ABS. NEUTROPHILS 03/29/2023 3.7  1.8 - 8.0 K/UL Final    ABS. LYMPHOCYTES 03/29/2023 2.7  0.8 - 3.5 K/UL Final    ABS. MONOCYTES 03/29/2023 0.6  0.0 - 1.0 K/UL Final    ABS. EOSINOPHILS 03/29/2023 0.1  0.0 - 0.4 K/UL Final    ABS. BASOPHILS 03/29/2023 0.0  0.0 - 0.1 K/UL Final    ABS. IMM.  GRANS. 03/29/2023 0.0  0.00 - 0.04 K/UL Final    DF 03/29/2023 AUTOMATED    Final    Sodium 03/29/2023 137  136 - 145 mmol/L Final    Potassium 03/29/2023 3.6  3.5 - 5.1 mmol/L Final    Chloride 03/29/2023 108  97 - 108 mmol/L Final    CO2 03/29/2023 25  21 - 32 mmol/L Final    Anion gap 03/29/2023 4 (A)  5 - 15 mmol/L Final    Glucose 03/29/2023 84  65 - 100 mg/dL Final    BUN 03/29/2023 15  6 - 20 MG/DL Final    Creatinine 03/29/2023 0.71  0.55 - 1.02 MG/DL Final    BUN/Creatinine ratio 03/29/2023 21 (A)  12 - 20   Final    eGFR 03/29/2023 >60  >60 ml/min/1.73m2 Final    Comment:      Pediatric calculator link: [employer]. org/professionals/kdoqi/gfr_calculatorped       These results are not intended for use in patients <25years of age. eGFR results are calculated without a race factor using  the 2021 CKD-EPI equation. Careful clinical correlation is recommended, particularly when comparing to results calculated using previous equations. The CKD-EPI equation is less accurate in patients with extremes of muscle mass, extra-renal metabolism of creatinine, excessive creatine ingestion, or following therapy that affects renal tubular secretion. Calcium 03/29/2023 9.1  8.5 - 10.1 MG/DL Final    Bilirubin, total 03/29/2023 0.3  0.2 - 1.0 MG/DL Final    ALT (SGPT) 03/29/2023 22  12 - 78 U/L Final    AST (SGOT) 03/29/2023 21  15 - 37 U/L Final    Alk. phosphatase 03/29/2023 93  45 - 117 U/L Final    Protein, total 03/29/2023 7.7  6.4 - 8.2 g/dL Final    Albumin 03/29/2023 3.7  3.5 - 5.0 g/dL Final    Globulin 03/29/2023 4.0  2.0 - 4.0 g/dL Final    A-G Ratio 03/29/2023 0.9 (A)  1.1 - 2.2   Final    SAMPLES BEING HELD 03/29/2023 1RED,1BLU   Final    COMMENT 03/29/2023 Add-on orders for these samples will be processed based on acceptable specimen integrity and analyte stability, which may vary by analyte. Final    Troponin-High Sensitivity 03/29/2023 3  0 - 37 ng/L Final    Comment: A HS troponin value change of (+ or -) 50% or more below the 99th percentile, in a 1/2/3 hr interval represents a significant change. Clinical correlation is recommended. A HS troponin value change of (+ or -) 20% or above the 99th percentile, in a 1/2/3 hr interval represents a significant change. Clinical correlation is recommended.   99th percentile: Women 0-37 ng/L Men 0-57 ng/L  Patients taking more than 20 mg/day of biotin may have falsely negative results and should not use this test.  Method used is Siemens Advia Centaur XPT      HCG, Ql. 03/29/2023 Negative  NEG   Final    The serum pregnancy test becomes positive at about the time of implantation of the conceptus and approximates a quantitative bHCG value of >5 mIU/ML. NT pro-BNP 03/29/2023 37  <125 PG/ML Final    Comment:      NT-proBNP is highly sensitive for the detection of acute congestive heart failure in dyspneic patients. A NT-proBNP result <300 pg/mL has a negative predictive value of 98% for acute heart failure. Marked elevations in NT-proBNP levels may be observed in patients with left ventricular congestive failure, atrial fibrillation without clear heart failure, acute coronary syndromes, right heart strain/failure (including pulmonary embolism and cor pulmonale), critical illness, renal failure or advanced age. Falsely low NT-proBNP may be observed in obese CHF patients. Recent research  suggests the following cutoff values are appropriate based on patient age and clinical setting, reduce false positive (FP) results, and improve positive predictive values for acute heart failure:  Acutely dyspneic patient: age <50, use cutoff of 450 pg/mL  Acutely dyspneic patient: age 54-65, use cutoff of 900 pg/mL  Acutely dyspneic patient: age                            >76, use cutoff of 1800 pg/mL       FDA approved cutpoints for ruling-out heart failure in the office:  Ambulatory patient: age <73, use cutoff of 125 pg/mL  Ambulatory patient: age >76, use cutoff of 450 pg/mL      D-dimer 03/29/2023 0.51  0.00 - 0.65 mg/L FEU Final    Comment: (NOTE)  The combination of a low pre-test probability based on Wells criteria  and a D-Dimer result below the cutoff value of 0.5 mg/L increases the   negative predictive value for DVT to %.          IMAGING RESULTS:  XR CHEST PA LAT   Final Result      No acute abnormality. MEDICATIONS GIVEN:  Medications   benzonatate (TESSALON) capsule 200 mg (200 mg Oral Given 3/29/23 0853)   ketorolac (TORADOL) injection 15 mg (15 mg IntraVENous Given 3/29/23 0853)       IMPRESSION:  1. Chest pain, unspecified type Active       PLAN:  - Discharge  Discharge Medication List as of 3/29/2023 11:43 AM        START taking these medications    Details   ketorolac (TORADOL) 10 mg tablet Take 1 Tablet by mouth every six (6) hours as needed for Pain for up to 12 doses. , Print, Disp-12 Tablet, R-0      benzonatate (Tessalon Perles) 100 mg capsule Take 2 Capsules by mouth three (3) times daily as needed for Cough for up to 14 days. , Print, Disp-30 Capsule, R-0           CONTINUE these medications which have NOT CHANGED    Details   metFORMIN (GLUCOPHAGE) 1,000 mg tablet Take 1,000 mg by mouth two (2) times daily (with meals). , Historical Med      lidocaine (XYLOCAINE) 2 % solution Take 15 mL by mouth as needed for Pain., Print, Disp-1 Bottle, R-0           Follow-up Information       Follow up With Specialties Details Why Contact Info        Please return to this Emergency Department if symptoms worsen as we discussed.       [facility]  Schedule an appointment as soon as possible for a visit in 2 days  [address] [facility]  Schedule an appointment as soon as possible for a visit in 2 days  [address] [facility] [address] [facility] [id number]          Return precautions given    Pooja Case MD

## 2023-03-29 NOTE — ED TRIAGE NOTES
Patient arrived ambulatory with c/o chest pain, left arm numbness and SOB. Patient was seen at urgent care which referred her to cardiologist for further evaluation.      Hx: hypertension

## 2023-03-29 NOTE — Clinical Note
[address]  [address] [facility] [address]  [phone]    Work/School Note    Date: 3/29/2023    To Whom It May concern:      Sarah Harris was seen and treated today in the emergency room by the following provider(s):  Attending Provider: Daisha Hummel MD.      Sarah Harris is excused from work/school on 03/29/23. She is clear to return to work/school on 03/30/23.         Sincerely,          Solange Beard MD

## 2023-03-29 NOTE — Clinical Note
[address] [address]  [phone]    Work/School Note    Date: 3/29/2023    To Whom It May concern:    Dorina Chester was seen and treated today in the emergency room by the following provider(s):  Attending Provider: Jama Miller MD.      Dorina Chester is excused from work/school on 03/29/23 and 03/30/23. She is medically clear to return to work/school on 3/31/2023.        Sincerely,          Maurisio Brush MD

## 2023-03-29 NOTE — Clinical Note
[address]  [address] [facility] [address]  [phone]    Work/School Note    Date: 3/29/2023    To Whom It May concern:      Sergio Llamas was seen and treated today in the emergency room by the following provider(s):  Attending Provider: Diamond Watson MD.      Sergio Llamas is excused from work/school on 03/29/23. She is clear to return to work/school on 03/30/23.         Sincerely,          Jj Orellana MD

## 2023-11-15 ENCOUNTER — TRANSCRIBE ORDERS (OUTPATIENT)
Dept: ADMINISTRATIVE | Facility: HOSPITAL | Age: 48
End: 2023-11-15
Payer: COMMERCIAL

## 2023-11-15 DIAGNOSIS — Z12.31 BREAST CANCER SCREENING BY MAMMOGRAM: Primary | ICD-10-CM

## 2023-11-21 ENCOUNTER — HOSPITAL ENCOUNTER (OUTPATIENT)
Facility: HOSPITAL | Age: 48
Discharge: HOME | End: 2023-11-21
Admitting: NURSE PRACTITIONER
Payer: COMMERCIAL

## 2023-11-21 DIAGNOSIS — Z12.31 BREAST CANCER SCREENING BY MAMMOGRAM: ICD-10-CM

## 2023-11-21 PROCEDURE — 77063 BREAST TOMOSYNTHESIS BI: CPT

## 2023-11-21 PROCEDURE — 77067 SCR MAMMO BI INCL CAD: CPT

## 2024-07-05 ENCOUNTER — OFFICE VISIT (OUTPATIENT)
Dept: UROLOGY | Facility: CLINIC | Age: 49
End: 2024-07-05
Payer: COMMERCIAL

## 2024-07-05 VITALS
SYSTOLIC BLOOD PRESSURE: 123 MMHG | DIASTOLIC BLOOD PRESSURE: 81 MMHG | BODY MASS INDEX: 28.09 KG/M2 | HEART RATE: 77 BPM | WEIGHT: 174.8 LBS | HEIGHT: 66 IN

## 2024-07-05 DIAGNOSIS — N20.1 RIGHT URETERAL STONE: Primary | ICD-10-CM

## 2024-07-05 DIAGNOSIS — N20.0 BILATERAL KIDNEY STONES: ICD-10-CM

## 2024-07-05 RX ORDER — ERENUMAB-AOOE 140 MG/ML
INJECTION, SOLUTION SUBCUTANEOUS
COMMUNITY
Start: 2024-06-27

## 2024-07-05 RX ORDER — POTASSIUM CHLORIDE 750 MG/1
TABLET, FILM COATED, EXTENDED RELEASE ORAL
COMMUNITY
Start: 2024-06-21

## 2024-07-05 RX ORDER — UBROGEPANT 100 MG/1
TABLET ORAL
COMMUNITY
Start: 2024-05-03

## 2024-07-05 RX ORDER — FAMOTIDINE 40 MG/1
TABLET, FILM COATED ORAL
COMMUNITY
Start: 2024-06-21

## 2024-07-05 RX ORDER — KETOROLAC TROMETHAMINE 30 MG/ML
60 INJECTION, SOLUTION INTRAMUSCULAR; INTRAVENOUS ONCE
Status: COMPLETED | OUTPATIENT
Start: 2024-07-05 | End: 2024-07-05

## 2024-07-05 RX ORDER — TAMSULOSIN HYDROCHLORIDE 0.4 MG/1
0.4 CAPSULE ORAL DAILY
COMMUNITY
Start: 2024-07-04 | End: 2024-08-03

## 2024-07-05 RX ORDER — CETIRIZINE HYDROCHLORIDE, PSEUDOEPHEDRINE HYDROCHLORIDE 5; 120 MG/1; MG/1
TABLET, FILM COATED, EXTENDED RELEASE ORAL
COMMUNITY
Start: 2024-06-27

## 2024-07-05 RX ORDER — ONDANSETRON 4 MG/1
4 TABLET, ORALLY DISINTEGRATING ORAL
COMMUNITY
Start: 2024-07-04 | End: 2024-07-11

## 2024-07-05 RX ORDER — NAPROXEN 500 MG/1
500 TABLET ORAL
COMMUNITY
Start: 2024-07-04 | End: 2024-07-18

## 2024-07-05 RX ORDER — SEMAGLUTIDE 0.68 MG/ML
INJECTION, SOLUTION SUBCUTANEOUS
COMMUNITY
Start: 2024-06-14

## 2024-07-05 RX ORDER — HYDROCODONE BITARTRATE AND ACETAMINOPHEN 5; 325 MG/1; MG/1
1 TABLET ORAL
COMMUNITY
Start: 2024-07-04

## 2024-07-05 RX ADMIN — KETOROLAC TROMETHAMINE 60 MG: 30 INJECTION, SOLUTION INTRAMUSCULAR; INTRAVENOUS at 10:59

## 2024-07-05 NOTE — PROGRESS NOTES
"Chief Complaint:    Chief Complaint   Patient presents with    Nephrolithiasis       Vital Signs:   /81   Pulse 77   Ht 167.6 cm (65.98\")   Wt 79.3 kg (174 lb 12.8 oz)   BMI 28.23 kg/m²   Body mass index is 28.23 kg/m².      HPI:  Marisol Overton is a 48 y.o. female who presents today for initial evaluation     History of Present Illness  Ms. Overton presents to the clinic for evaluation of nephrolithiasis.  She has been referred to us by Dr. Shaquille Gustafson DO.  She has a past medical history significant for type 2 diabetes mellitus, hypertension, and ovarian carcinoma.  She was recently seen in the emergency department yesterday secondary to severe right-sided back and flank pain with radiation of the right lower quadrant.  She states that this occurred roughly 2 AM and woke her up while sleeping.  The pain persisted and worsened and she went to the ER for evaluation at [facility].  She had a CT scan of the abdomen pelvis completed at that time that showed a distal right UVJ calculus measuring 5 mm in size causing mild to moderate obstructive uropathy.  There was other known obstructing tiny bilateral kidney stones.  Patient states she did previously passed 1 kidney stone roughly a year ago.  She reports pain is a 7 out of 10 this morning.  She states it has been decently controlled since ER visit.  She denies any fever, chills, gross hematuria, or inability urinate.  She does endorse some frequency and urgency.  Urine analysis at time of ER visit was unremarkable for blood only with some very few microscopic white blood cells.  Her CMP showed a GFR greater than 40, creatinine of 0.8, and normal BUN.  White blood cell count was normal at 9000.  She does endorse drinking pop daily.      Past Medical History:  Past Medical History:   Diagnosis Date    Arthritis     Asthma     Diabetes mellitus     Elevated cholesterol     Hypertension     BUCK (obstructive sleep apnea) 09/14/2021    Ovarian " cancer 1997    PONV (postoperative nausea and vomiting)        Current Meds:  Current Outpatient Medications   Medication Sig Dispense Refill    Aimovig 140 MG/ML auto-injector INJECT 1 ML SUBCUTANEOUSLY ONCE A MONTH FOR HEADACHE      celecoxib (CeleBREX) 200 MG capsule Take 1 capsule by mouth 2 (Two) Times a Day.      cetirizine-pseudoephedrine (ZyrTEC-D) 5-120 MG per 12 hr tablet TAKE 1 TABLET BY MOUTH ONCE A DAY AS NEEDED FOR CONGESTION      cyanocobalamin 1000 MCG/ML injection INJECT 1 ML INTRAMUSCULARLY EVERY TWO WEEKS      EPINEPHrine (EPIPEN IJ) Inject  as directed Take As Directed.      famotidine (PEPCID) 40 MG tablet TAKE 1 TABLET BY MOUTH TWICE A DAY FOR STOMACH      Fluticasone Furoate-Vilanterol (BREO ELLIPTA) 200-25 MCG/INH inhaler Inhale 1 puff Daily. 60 each 5    gabapentin (NEURONTIN) 800 MG tablet Take 1 tablet by mouth 3 (Three) Times a Day.      HYDROcodone-acetaminophen (NORCO) 5-325 MG per tablet Take 1 tablet by mouth.      losartan (COZAAR) 25 MG tablet Take 1 tablet by mouth Daily.      metoprolol succinate XL (TOPROL-XL) 25 MG 24 hr tablet TAKE 1 TABLET BY MOUTH ONCE A DAY HEART AND BLOOD PRESSURE      naproxen (NAPROSYN) 500 MG tablet Take 1 tablet by mouth.      ondansetron ODT (ZOFRAN-ODT) 4 MG disintegrating tablet Take 1 tablet by mouth.      Ozempic, 0.25 or 0.5 MG/DOSE, 2 MG/3ML solution pen-injector INJECT 0.25 MG SUBCUTANEOUSLY ONCE A WEEK FOR SUGAR      potassium chloride 10 MEQ CR tablet TAKE 1 TABLET BY MOUTH ONCE A DAY POTASSIUM      tamsulosin (FLOMAX) 0.4 MG capsule 24 hr capsule Take 1 capsule by mouth Daily.      traMADol (ULTRAM) 50 MG tablet Take 1 tablet by mouth Daily.      Ubrelvy 100 MG tablet TAKE 1 TABLET BY MOUTH ONCE A DAY AS NEEDED FOR HEADACHE       No current facility-administered medications for this visit.        Allergies:   Allergies   Allergen Reactions    Bee Venom Anaphylaxis    Shellfish-Derived Products Shortness Of Breath and Itching    Other Other  (See Comments)     PT REPORTS ALLERGY TEST SHOW POSITIVE ALLERGY-STATES THAT MEAT PRODUCTS GETS STUCK IN THROAT BUT NO REACTION TO MEAT PRODUCTS-STATES SHE STILL EATS THEM    Watermelon Flavor Rash        Past Surgical History:  Past Surgical History:   Procedure Laterality Date    APPENDECTOMY       SECTION      X 4    CHOLECYSTECTOMY      COLONOSCOPY      CYST REMOVAL Left     NECK    ENDOSCOPY N/A 2020    Procedure: ESOPHAGOGASTRODUODENOSCOPY WITH DILATATION CPT CODE: 76290;  Surgeon: Jose Roberto Howard MD;  Location: [facility] OR;  Service: Gastroenterology;  Laterality: N/A;    ENDOSCOPY N/A 2022    Procedure: ESOPHAGOGASTRODUODENOSCOPY WITH BIOPSY AND DILATATION;  Surgeon: Jose Roberto Howard MD;  Location:  [facility] OR;  Service: Gastroenterology;  Laterality: N/A;  DILATED ESOPHAGUS TO 20 MM    HEMORRHOIDECTOMY      OOPHORECTOMY      UNKNOWN PER PT       Social History:  Social History     Socioeconomic History    Marital status:    Tobacco Use    Smoking status: Never     Passive exposure: Never    Smokeless tobacco: Never   Vaping Use    Vaping status: Some Days    Substances: Flavoring   Substance and Sexual Activity    Alcohol use: Never    Drug use: Never    Sexual activity: Defer       Family History:  Family History   Problem Relation Age of Onset    Diabetes Mother     Hyperlipidemia Mother     Cancer Maternal Grandmother         breast    Lung cancer Maternal Grandmother     Cancer Paternal Grandmother         breast    Breast cancer Paternal Grandmother        Review of Systems:  Review of Systems   Constitutional:  Negative for fatigue, fever and unexpected weight change.   Respiratory:  Negative for chest tightness and shortness of breath.    Cardiovascular:  Negative for chest pain.   Gastrointestinal:  Negative for abdominal pain, constipation, diarrhea, nausea and vomiting.   Genitourinary:  Positive for flank pain and frequency. Negative for difficulty  urinating, dysuria and urgency.   Musculoskeletal:  Positive for back pain.   Skin:  Negative for rash.   Psychiatric/Behavioral:  Negative for confusion and suicidal ideas.        Physical Exam:  Physical Exam  Constitutional:       General: She is not in acute distress.     Appearance: Normal appearance.   HENT:      Head: Normocephalic and atraumatic.      Nose: Nose normal.      Mouth/Throat:      Mouth: Mucous membranes are moist.   Eyes:      Conjunctiva/sclera: Conjunctivae normal.   Cardiovascular:      Rate and Rhythm: Normal rate and regular rhythm.      Pulses: Normal pulses.      Heart sounds: Normal heart sounds.   Pulmonary:      Effort: Pulmonary effort is normal.      Breath sounds: Normal breath sounds.   Abdominal:      General: Bowel sounds are normal.      Palpations: Abdomen is soft.   Musculoskeletal:         General: Normal range of motion.      Cervical back: Normal range of motion.   Skin:     General: Skin is warm.   Neurological:      General: No focal deficit present.      Mental Status: She is alert and oriented to person, place, and time.   Psychiatric:         Mood and Affect: Mood normal.         Behavior: Behavior normal.         Thought Content: Thought content normal.         Judgment: Judgment normal.           Recent Image (CT and/or KUB):   CT Abdomen and Pelvis: No results found for this or any previous visit.     CT Stone Protocol: No results found for this or any previous visit.     KUB: No results found for this or any previous visit.       Labs:  Brief Urine Lab Results  (Last result in the past 365 days)        Color   Clarity   Blood   Leuk Est   Nitrite   Protein   CREAT   Urine HCG        07/07/23 0938               Neg  Comment: Please note that if significant procedural or therapeutic interventions are anticipated for your patient, repeat Beta HCG on serum for qualitative and potentially quantitative results should be performed.             No visits with results  within 3 Month(s) from this visit.   Latest known visit with results is:   Orders Only on 10/14/2022   Component Date Value Ref Range Status    Wound Culture 10/14/2022 No growth at 3 days   Final    Gram Stain 10/14/2022 Few (2+) WBCs seen   Final    Gram Stain 10/14/2022 No organisms seen   Final        Procedure: None  Procedures     I have reviewed and agree with the above PMH, PSH, FMH, social history, medications, allergies, and labs.     Assessment/Plan:   Problem List Items Addressed This Visit       Right ureteral stone - Primary    Relevant Medications    ketorolac (TORADOL) injection 60 mg (Completed)    Other Relevant Orders    Case Request (Completed)     Other Visit Diagnoses       Bilateral kidney stones        Relevant Medications    ketorolac (TORADOL) injection 60 mg (Completed)            Health Maintenance:   Health Maintenance Due   Topic Date Due    COLORECTAL CANCER SCREENING  Never done    Pneumococcal Vaccine 0-64 (1 of 2 - PCV) Never done    TDAP/TD VACCINES (2 - Tdap) 09/11/2013    HEPATITIS C SCREENING  Never done    ANNUAL PHYSICAL  Never done    PAP SMEAR  Never done    LIPID PANEL  10/06/2022    COVID-19 Vaccine (2 - 2023-24 season) 09/01/2023        Smoking Counseling: Never smoked.  Never used smokeless tobacco.  Counseling given.    Urine Incontinence: Patient reports that she is not currently experiencing any symptoms of urinary incontinence.    Patient was given instructions and counseling regarding her condition or for health maintenance advice. Please see specific information pulled into the AVS if appropriate.    Patient Education:   Nephrolithiasis- It was discussed with the patient the presence of a distal right UVJ calculus measuring 5 mm in size.  Also discussed with her tiny punctate kidney stones bilaterally.  We discussed the various therapeutic options available including percutaneous nephrostolithotomy, ureteroscopy and extracorporeal shockwave  lithotripsy.  We  discussed the risks of lithotripsy including the passage of stones leading to a 3% chance of Steinstrasse or a large string of stones in the distal ureter. In this incidence the patient was informed that a ureteroscopy is indicated for obstructing fragments.  Patient was informed of an extremely rare incidence of renal hematoma and the significance of this.  Patient was educated on percutaneous nephrostolithotomy and its use as well as the risks and benefits such as the need for postoperative hospitalization, and the risk of damage to the kidney and the remote risk of a nephrectomy.  We also discussed the use of ureteroscopy in the upper tracts and its decreased success rate to completely remove the stones likely causing stent placement leading to an additional procedure for removal.  We discussed the absolute relative indicators for intervention including the presence of sepsis and uncontrollable pain leading to need for urgent intervention.  We discussed placement of a stent if indicated and the management of the stent as well.  Patient is desirous to undergo surgery for stone removal we will get her scheduled appropriately with Dr. Pedraza on 7/12/2024.  Advised to continue with Flomax 0.4 mg once daily.  Educated her to increase water intake 2 to 3 L/day.  Advised her to decrease her limiting factors contributing to stone formation.  Did discuss the nature of a right ureteroscopy with home laser lithotripsy as prescribed above.  Will give her a shot of Toradol 60 mg in office today for pain control.  Advised her to call back with any questions or concerns.  She verbalized understanding.    Visit Diagnoses:    ICD-10-CM ICD-9-CM   1. Right ureteral stone  N20.1 592.1   2. Bilateral kidney stones  N20.0 592.0       Meds Ordered During Visit:  New Medications Ordered This Visit   Medications    ketorolac (TORADOL) injection 60 mg       Follow Up Appointment: Right U scope on 7/12/2024  No follow-ups on  file.      This document has been electronically signed by Saad Steiner PA-C   July 5, 2024 11:18 EDT    Part of this note may be an electronic transcription/translation of spoken language to printed text using the Dragon Dictation System.

## 2024-07-05 NOTE — H&P (VIEW-ONLY)
"Chief Complaint:    Chief Complaint   Patient presents with    Nephrolithiasis       Vital Signs:   /81   Pulse 77   Ht 167.6 cm (65.98\")   Wt 79.3 kg (174 lb 12.8 oz)   BMI 28.23 kg/m²   Body mass index is 28.23 kg/m².      HPI:  Marisol Overton is a 48 y.o. female who presents today for initial evaluation     History of Present Illness  Ms. Overton presents to the clinic for evaluation of nephrolithiasis.  She has been referred to us by Dr. Shaquille Gustafson DO.  She has a past medical history significant for type 2 diabetes mellitus, hypertension, and ovarian carcinoma.  She was recently seen in the emergency department yesterday secondary to severe right-sided back and flank pain with radiation of the right lower quadrant.  She states that this occurred roughly 2 AM and woke her up while sleeping.  The pain persisted and worsened and she went to the ER for evaluation at [facility] in [facility].  She had a CT scan of the abdomen pelvis completed at that time that showed a distal right UVJ calculus measuring 5 mm in size causing mild to moderate obstructive uropathy.  There was other known obstructing tiny bilateral kidney stones.  Patient states she did previously passed 1 kidney stone roughly a year ago.  She reports pain is a 7 out of 10 this morning.  She states it has been decently controlled since ER visit.  She denies any fever, chills, gross hematuria, or inability urinate.  She does endorse some frequency and urgency.  Urine analysis at time of ER visit was unremarkable for blood only with some very few microscopic white blood cells.  Her CMP showed a GFR greater than 40, creatinine of 0.8, and normal BUN.  White blood cell count was normal at 9000.  She does endorse drinking pop daily.      Past Medical History:  Past Medical History:   Diagnosis Date    Arthritis     Asthma     Diabetes mellitus     Elevated cholesterol     Hypertension     BUCK (obstructive sleep apnea) 09/14/2021    Ovarian " cancer 1997    PONV (postoperative nausea and vomiting)        Current Meds:  Current Outpatient Medications   Medication Sig Dispense Refill    Aimovig 140 MG/ML auto-injector INJECT 1 ML SUBCUTANEOUSLY ONCE A MONTH FOR HEADACHE      celecoxib (CeleBREX) 200 MG capsule Take 1 capsule by mouth 2 (Two) Times a Day.      cetirizine-pseudoephedrine (ZyrTEC-D) 5-120 MG per 12 hr tablet TAKE 1 TABLET BY MOUTH ONCE A DAY AS NEEDED FOR CONGESTION      cyanocobalamin 1000 MCG/ML injection INJECT 1 ML INTRAMUSCULARLY EVERY TWO WEEKS      EPINEPHrine (EPIPEN IJ) Inject  as directed Take As Directed.      famotidine (PEPCID) 40 MG tablet TAKE 1 TABLET BY MOUTH TWICE A DAY FOR STOMACH      Fluticasone Furoate-Vilanterol (BREO ELLIPTA) 200-25 MCG/INH inhaler Inhale 1 puff Daily. 60 each 5    gabapentin (NEURONTIN) 800 MG tablet Take 1 tablet by mouth 3 (Three) Times a Day.      HYDROcodone-acetaminophen (NORCO) 5-325 MG per tablet Take 1 tablet by mouth.      losartan (COZAAR) 25 MG tablet Take 1 tablet by mouth Daily.      metoprolol succinate XL (TOPROL-XL) 25 MG 24 hr tablet TAKE 1 TABLET BY MOUTH ONCE A DAY HEART AND BLOOD PRESSURE      naproxen (NAPROSYN) 500 MG tablet Take 1 tablet by mouth.      ondansetron ODT (ZOFRAN-ODT) 4 MG disintegrating tablet Take 1 tablet by mouth.      Ozempic, 0.25 or 0.5 MG/DOSE, 2 MG/3ML solution pen-injector INJECT 0.25 MG SUBCUTANEOUSLY ONCE A WEEK FOR SUGAR      potassium chloride 10 MEQ CR tablet TAKE 1 TABLET BY MOUTH ONCE A DAY POTASSIUM      tamsulosin (FLOMAX) 0.4 MG capsule 24 hr capsule Take 1 capsule by mouth Daily.      traMADol (ULTRAM) 50 MG tablet Take 1 tablet by mouth Daily.      Ubrelvy 100 MG tablet TAKE 1 TABLET BY MOUTH ONCE A DAY AS NEEDED FOR HEADACHE       No current facility-administered medications for this visit.        Allergies:   Allergies   Allergen Reactions    Bee Venom Anaphylaxis    Shellfish-Derived Products Shortness Of Breath and Itching    Other Other  (See Comments)     PT REPORTS ALLERGY TEST SHOW POSITIVE ALLERGY-STATES THAT MEAT PRODUCTS GETS STUCK IN THROAT BUT NO REACTION TO MEAT PRODUCTS-STATES SHE STILL EATS THEM    Watermelon Flavor Rash        Past Surgical History:  Past Surgical History:   Procedure Laterality Date    APPENDECTOMY       SECTION      X 4    CHOLECYSTECTOMY      COLONOSCOPY      CYST REMOVAL Left     NECK    ENDOSCOPY N/A 2020    Procedure: ESOPHAGOGASTRODUODENOSCOPY WITH DILATATION CPT CODE: 84995;  Surgeon: Jose Roberto Howard MD;  Location: [facility] OR;  Service: Gastroenterology;  Laterality: N/A;    ENDOSCOPY N/A 2022    Procedure: ESOPHAGOGASTRODUODENOSCOPY WITH BIOPSY AND DILATATION;  Surgeon: Jose Roberto Howard MD;  Location:  [facility] OR;  Service: Gastroenterology;  Laterality: N/A;  DILATED ESOPHAGUS TO 20 MM    HEMORRHOIDECTOMY      OOPHORECTOMY      UNKNOWN PER PT       Social History:  Social History     Socioeconomic History    Marital status:    Tobacco Use    Smoking status: Never     Passive exposure: Never    Smokeless tobacco: Never   Vaping Use    Vaping status: Some Days    Substances: Flavoring   Substance and Sexual Activity    Alcohol use: Never    Drug use: Never    Sexual activity: Defer       Family History:  Family History   Problem Relation Age of Onset    Diabetes Mother     Hyperlipidemia Mother     Cancer Maternal Grandmother         breast    Lung cancer Maternal Grandmother     Cancer Paternal Grandmother         breast    Breast cancer Paternal Grandmother        Review of Systems:  Review of Systems   Constitutional:  Negative for fatigue, fever and unexpected weight change.   Respiratory:  Negative for chest tightness and shortness of breath.    Cardiovascular:  Negative for chest pain.   Gastrointestinal:  Negative for abdominal pain, constipation, diarrhea, nausea and vomiting.   Genitourinary:  Positive for flank pain and frequency. Negative for difficulty  urinating, dysuria and urgency.   Musculoskeletal:  Positive for back pain.   Skin:  Negative for rash.   Psychiatric/Behavioral:  Negative for confusion and suicidal ideas.        Physical Exam:  Physical Exam  Constitutional:       General: She is not in acute distress.     Appearance: Normal appearance.   HENT:      Head: Normocephalic and atraumatic.      Nose: Nose normal.      Mouth/Throat:      Mouth: Mucous membranes are moist.   Eyes:      Conjunctiva/sclera: Conjunctivae normal.   Cardiovascular:      Rate and Rhythm: Normal rate and regular rhythm.      Pulses: Normal pulses.      Heart sounds: Normal heart sounds.   Pulmonary:      Effort: Pulmonary effort is normal.      Breath sounds: Normal breath sounds.   Abdominal:      General: Bowel sounds are normal.      Palpations: Abdomen is soft.   Musculoskeletal:         General: Normal range of motion.      Cervical back: Normal range of motion.   Skin:     General: Skin is warm.   Neurological:      General: No focal deficit present.      Mental Status: She is alert and oriented to person, place, and time.   Psychiatric:         Mood and Affect: Mood normal.         Behavior: Behavior normal.         Thought Content: Thought content normal.         Judgment: Judgment normal.           Recent Image (CT and/or KUB):   CT Abdomen and Pelvis: No results found for this or any previous visit.     CT Stone Protocol: No results found for this or any previous visit.     KUB: No results found for this or any previous visit.       Labs:  Brief Urine Lab Results  (Last result in the past 365 days)        Color   Clarity   Blood   Leuk Est   Nitrite   Protein   CREAT   Urine HCG        07/07/23 0938               Neg  Comment: Please note that if significant procedural or therapeutic interventions are anticipated for your patient, repeat Beta HCG on serum for qualitative and potentially quantitative results should be performed.             No visits with results  within 3 Month(s) from this visit.   Latest known visit with results is:   Orders Only on 10/14/2022   Component Date Value Ref Range Status    Wound Culture 10/14/2022 No growth at 3 days   Final    Gram Stain 10/14/2022 Few (2+) WBCs seen   Final    Gram Stain 10/14/2022 No organisms seen   Final        Procedure: None  Procedures     I have reviewed and agree with the above PMH, PSH, FMH, social history, medications, allergies, and labs.     Assessment/Plan:   Problem List Items Addressed This Visit       Right ureteral stone - Primary    Relevant Medications    ketorolac (TORADOL) injection 60 mg (Completed)    Other Relevant Orders    Case Request (Completed)     Other Visit Diagnoses       Bilateral kidney stones        Relevant Medications    ketorolac (TORADOL) injection 60 mg (Completed)            Health Maintenance:   Health Maintenance Due   Topic Date Due    COLORECTAL CANCER SCREENING  Never done    Pneumococcal Vaccine 0-64 (1 of 2 - PCV) Never done    TDAP/TD VACCINES (2 - Tdap) 09/11/2013    HEPATITIS C SCREENING  Never done    ANNUAL PHYSICAL  Never done    PAP SMEAR  Never done    LIPID PANEL  10/06/2022    COVID-19 Vaccine (2 - 2023-24 season) 09/01/2023        Smoking Counseling: Never smoked.  Never used smokeless tobacco.  Counseling given.    Urine Incontinence: Patient reports that she is not currently experiencing any symptoms of urinary incontinence.    Patient was given instructions and counseling regarding her condition or for health maintenance advice. Please see specific information pulled into the AVS if appropriate.    Patient Education:   Nephrolithiasis- It was discussed with the patient the presence of a distal right UVJ calculus measuring 5 mm in size.  Also discussed with her tiny punctate kidney stones bilaterally.  We discussed the various therapeutic options available including percutaneous nephrostolithotomy, ureteroscopy and extracorporeal shockwave  lithotripsy.  We  discussed the risks of lithotripsy including the passage of stones leading to a 3% chance of Steinstrasse or a large string of stones in the distal ureter. In this incidence the patient was informed that a ureteroscopy is indicated for obstructing fragments.  Patient was informed of an extremely rare incidence of renal hematoma and the significance of this.  Patient was educated on percutaneous nephrostolithotomy and its use as well as the risks and benefits such as the need for postoperative hospitalization, and the risk of damage to the kidney and the remote risk of a nephrectomy.  We also discussed the use of ureteroscopy in the upper tracts and its decreased success rate to completely remove the stones likely causing stent placement leading to an additional procedure for removal.  We discussed the absolute relative indicators for intervention including the presence of sepsis and uncontrollable pain leading to need for urgent intervention.  We discussed placement of a stent if indicated and the management of the stent as well.  Patient is desirous to undergo surgery for stone removal we will get her scheduled appropriately with Dr. Pedraza on 7/12/2024.  Advised to continue with Flomax 0.4 mg once daily.  Educated her to increase water intake 2 to 3 L/day.  Advised her to decrease her limiting factors contributing to stone formation.  Did discuss the nature of a right ureteroscopy with home laser lithotripsy as prescribed above.  Will give her a shot of Toradol 60 mg in office today for pain control.  Advised her to call back with any questions or concerns.  She verbalized understanding.    Visit Diagnoses:    ICD-10-CM ICD-9-CM   1. Right ureteral stone  N20.1 592.1   2. Bilateral kidney stones  N20.0 592.0       Meds Ordered During Visit:  New Medications Ordered This Visit   Medications    ketorolac (TORADOL) injection 60 mg       Follow Up Appointment: Right U scope on 7/12/2024  No follow-ups on  file.      This document has been electronically signed by Saad Steiner PA-C   July 5, 2024 11:18 EDT    Part of this note may be an electronic transcription/translation of spoken language to printed text using the Dragon Dictation System.

## 2024-07-12 ENCOUNTER — APPOINTMENT (OUTPATIENT)
Dept: GENERAL RADIOLOGY | Facility: HOSPITAL | Age: 49
End: 2024-07-12
Payer: COMMERCIAL

## 2024-07-12 ENCOUNTER — ANESTHESIA (OUTPATIENT)
Dept: PERIOP | Facility: HOSPITAL | Age: 49
End: 2024-07-12
Payer: COMMERCIAL

## 2024-07-12 ENCOUNTER — HOSPITAL ENCOUNTER (OUTPATIENT)
Facility: HOSPITAL | Age: 49
Setting detail: HOSPITAL OUTPATIENT SURGERY
Discharge: HOME | End: 2024-07-12
Attending: UROLOGY | Admitting: UROLOGY
Payer: COMMERCIAL

## 2024-07-12 ENCOUNTER — ANESTHESIA EVENT (OUTPATIENT)
Dept: PERIOP | Facility: HOSPITAL | Age: 49
End: 2024-07-12
Payer: COMMERCIAL

## 2024-07-12 VITALS
BODY MASS INDEX: 27.77 KG/M2 | WEIGHT: 172.8 LBS | HEART RATE: 82 BPM | TEMPERATURE: 97.8 F | HEIGHT: 66 IN | SYSTOLIC BLOOD PRESSURE: 138 MMHG | OXYGEN SATURATION: 99 % | RESPIRATION RATE: 18 BRPM | DIASTOLIC BLOOD PRESSURE: 70 MMHG

## 2024-07-12 DIAGNOSIS — N20.1 RIGHT URETERAL STONE: ICD-10-CM

## 2024-07-12 LAB
B-HCG UR QL: NEGATIVE
EXPIRATION DATE: NORMAL
GLUCOSE BLDC GLUCOMTR-MCNC: 151 MG/DL (ref 70–130)
GLUCOSE BLDC GLUCOMTR-MCNC: 81 MG/DL (ref 70–130)
INTERNAL NEGATIVE CONTROL: NEGATIVE
INTERNAL POSITIVE CONTROL: POSITIVE
Lab: NORMAL

## 2024-07-12 PROCEDURE — 25010000002 PROPOFOL 10 MG/ML EMULSION: Performed by: NURSE ANESTHETIST, CERTIFIED REGISTERED

## 2024-07-12 PROCEDURE — 25810000003 LACTATED RINGERS PER 1000 ML: Performed by: ANESTHESIOLOGY

## 2024-07-12 PROCEDURE — 25010000002 ONDANSETRON PER 1 MG: Performed by: NURSE ANESTHETIST, CERTIFIED REGISTERED

## 2024-07-12 PROCEDURE — 81025 URINE PREGNANCY TEST: CPT | Performed by: ANESTHESIOLOGY

## 2024-07-12 PROCEDURE — 82365 CALCULUS SPECTROSCOPY: CPT | Performed by: UROLOGY

## 2024-07-12 PROCEDURE — 25810000003 DEXTROSE 5 % AND SODIUM CHLORIDE 0.9 % 5-0.9 % SOLUTION: Performed by: ANESTHESIOLOGY

## 2024-07-12 PROCEDURE — 82948 REAGENT STRIP/BLOOD GLUCOSE: CPT

## 2024-07-12 PROCEDURE — 25010000002 DEXAMETHASONE PER 1 MG: Performed by: NURSE ANESTHETIST, CERTIFIED REGISTERED

## 2024-07-12 PROCEDURE — 76000 FLUOROSCOPY <1 HR PHYS/QHP: CPT

## 2024-07-12 PROCEDURE — 25010000002 GENTAMICIN PER 80 MG: Performed by: UROLOGY

## 2024-07-12 PROCEDURE — 25010000002 MIDAZOLAM PER 1 MG: Performed by: NURSE ANESTHETIST, CERTIFIED REGISTERED

## 2024-07-12 RX ORDER — SODIUM CHLORIDE, SODIUM LACTATE, POTASSIUM CHLORIDE, CALCIUM CHLORIDE 600; 310; 30; 20 MG/100ML; MG/100ML; MG/100ML; MG/100ML
100 INJECTION, SOLUTION INTRAVENOUS ONCE AS NEEDED
Status: DISCONTINUED | OUTPATIENT
Start: 2024-07-12 | End: 2024-07-12 | Stop reason: HOSPADM

## 2024-07-12 RX ORDER — MIDAZOLAM HYDROCHLORIDE 1 MG/ML
INJECTION INTRAMUSCULAR; INTRAVENOUS AS NEEDED
Status: DISCONTINUED | OUTPATIENT
Start: 2024-07-12 | End: 2024-07-12 | Stop reason: SURG

## 2024-07-12 RX ORDER — SODIUM CHLORIDE 9 MG/ML
40 INJECTION, SOLUTION INTRAVENOUS AS NEEDED
Status: DISCONTINUED | OUTPATIENT
Start: 2024-07-12 | End: 2024-07-12 | Stop reason: HOSPADM

## 2024-07-12 RX ORDER — SODIUM CHLORIDE 0.9 % (FLUSH) 0.9 %
10 SYRINGE (ML) INJECTION EVERY 12 HOURS SCHEDULED
Status: DISCONTINUED | OUTPATIENT
Start: 2024-07-12 | End: 2024-07-12 | Stop reason: HOSPADM

## 2024-07-12 RX ORDER — LIDOCAINE HYDROCHLORIDE 20 MG/ML
INJECTION, SOLUTION EPIDURAL; INFILTRATION; INTRACAUDAL; PERINEURAL AS NEEDED
Status: DISCONTINUED | OUTPATIENT
Start: 2024-07-12 | End: 2024-07-12 | Stop reason: SURG

## 2024-07-12 RX ORDER — HYDROCODONE BITARTRATE AND ACETAMINOPHEN 10; 325 MG/1; MG/1
1 TABLET ORAL EVERY 6 HOURS PRN
Qty: 12 TABLET | Refills: 0 | Status: SHIPPED | OUTPATIENT
Start: 2024-07-12

## 2024-07-12 RX ORDER — GENTAMICIN SULFATE 80 MG/100ML
80 INJECTION, SOLUTION INTRAVENOUS ONCE
Status: COMPLETED | OUTPATIENT
Start: 2024-07-12 | End: 2024-07-12

## 2024-07-12 RX ORDER — MEPERIDINE HYDROCHLORIDE 25 MG/ML
12.5 INJECTION INTRAMUSCULAR; INTRAVENOUS; SUBCUTANEOUS
Status: DISCONTINUED | OUTPATIENT
Start: 2024-07-12 | End: 2024-07-12 | Stop reason: HOSPADM

## 2024-07-12 RX ORDER — SODIUM CHLORIDE 0.9 % (FLUSH) 0.9 %
10 SYRINGE (ML) INJECTION AS NEEDED
Status: DISCONTINUED | OUTPATIENT
Start: 2024-07-12 | End: 2024-07-12 | Stop reason: HOSPADM

## 2024-07-12 RX ORDER — MIDAZOLAM HYDROCHLORIDE 1 MG/ML
1 INJECTION INTRAMUSCULAR; INTRAVENOUS
Status: DISCONTINUED | OUTPATIENT
Start: 2024-07-12 | End: 2024-07-12 | Stop reason: HOSPADM

## 2024-07-12 RX ORDER — DEXAMETHASONE SODIUM PHOSPHATE 4 MG/ML
INJECTION, SOLUTION INTRA-ARTICULAR; INTRALESIONAL; INTRAMUSCULAR; INTRAVENOUS; SOFT TISSUE AS NEEDED
Status: DISCONTINUED | OUTPATIENT
Start: 2024-07-12 | End: 2024-07-12 | Stop reason: SURG

## 2024-07-12 RX ORDER — ONDANSETRON 2 MG/ML
4 INJECTION INTRAMUSCULAR; INTRAVENOUS AS NEEDED
Status: DISCONTINUED | OUTPATIENT
Start: 2024-07-12 | End: 2024-07-12 | Stop reason: HOSPADM

## 2024-07-12 RX ORDER — OXYCODONE HYDROCHLORIDE AND ACETAMINOPHEN 5; 325 MG/1; MG/1
1 TABLET ORAL ONCE AS NEEDED
Status: DISCONTINUED | OUTPATIENT
Start: 2024-07-12 | End: 2024-07-12 | Stop reason: HOSPADM

## 2024-07-12 RX ORDER — IPRATROPIUM BROMIDE AND ALBUTEROL SULFATE 2.5; .5 MG/3ML; MG/3ML
3 SOLUTION RESPIRATORY (INHALATION) ONCE AS NEEDED
Status: DISCONTINUED | OUTPATIENT
Start: 2024-07-12 | End: 2024-07-12 | Stop reason: HOSPADM

## 2024-07-12 RX ORDER — LIDOCAINE HYDROCHLORIDE 20 MG/ML
JELLY TOPICAL AS NEEDED
Status: DISCONTINUED | OUTPATIENT
Start: 2024-07-12 | End: 2024-07-12 | Stop reason: HOSPADM

## 2024-07-12 RX ORDER — FENTANYL CITRATE 50 UG/ML
50 INJECTION, SOLUTION INTRAMUSCULAR; INTRAVENOUS
Status: DISCONTINUED | OUTPATIENT
Start: 2024-07-12 | End: 2024-07-12 | Stop reason: HOSPADM

## 2024-07-12 RX ORDER — ONDANSETRON 2 MG/ML
INJECTION INTRAMUSCULAR; INTRAVENOUS AS NEEDED
Status: DISCONTINUED | OUTPATIENT
Start: 2024-07-12 | End: 2024-07-12 | Stop reason: SURG

## 2024-07-12 RX ORDER — FAMOTIDINE 10 MG/ML
INJECTION, SOLUTION INTRAVENOUS AS NEEDED
Status: DISCONTINUED | OUTPATIENT
Start: 2024-07-12 | End: 2024-07-12 | Stop reason: SURG

## 2024-07-12 RX ORDER — SODIUM CHLORIDE, SODIUM LACTATE, POTASSIUM CHLORIDE, CALCIUM CHLORIDE 600; 310; 30; 20 MG/100ML; MG/100ML; MG/100ML; MG/100ML
125 INJECTION, SOLUTION INTRAVENOUS ONCE
Status: COMPLETED | OUTPATIENT
Start: 2024-07-12 | End: 2024-07-12

## 2024-07-12 RX ORDER — SCOLOPAMINE TRANSDERMAL SYSTEM 1 MG/1
1 PATCH, EXTENDED RELEASE TRANSDERMAL ONCE
Status: DISCONTINUED | OUTPATIENT
Start: 2024-07-12 | End: 2024-07-12 | Stop reason: HOSPADM

## 2024-07-12 RX ORDER — PROPOFOL 10 MG/ML
VIAL (ML) INTRAVENOUS AS NEEDED
Status: DISCONTINUED | OUTPATIENT
Start: 2024-07-12 | End: 2024-07-12 | Stop reason: SURG

## 2024-07-12 RX ORDER — KETOROLAC TROMETHAMINE 30 MG/ML
30 INJECTION, SOLUTION INTRAMUSCULAR; INTRAVENOUS EVERY 6 HOURS PRN
Status: DISCONTINUED | OUTPATIENT
Start: 2024-07-12 | End: 2024-07-12 | Stop reason: HOSPADM

## 2024-07-12 RX ORDER — DEXTROSE MONOHYDRATE AND SODIUM CHLORIDE 5; .9 G/100ML; G/100ML
125 INJECTION, SOLUTION INTRAVENOUS CONTINUOUS
Status: DISCONTINUED | OUTPATIENT
Start: 2024-07-12 | End: 2024-07-12 | Stop reason: HOSPADM

## 2024-07-12 RX ADMIN — DEXAMETHASONE SODIUM PHOSPHATE 4 MG: 4 INJECTION, SOLUTION INTRA-ARTICULAR; INTRALESIONAL; INTRAMUSCULAR; INTRAVENOUS; SOFT TISSUE at 16:19

## 2024-07-12 RX ADMIN — LIDOCAINE HYDROCHLORIDE 70 MG: 20 INJECTION, SOLUTION EPIDURAL; INFILTRATION; INTRACAUDAL; PERINEURAL at 16:19

## 2024-07-12 RX ADMIN — SODIUM CHLORIDE, POTASSIUM CHLORIDE, SODIUM LACTATE AND CALCIUM CHLORIDE: 600; 310; 30; 20 INJECTION, SOLUTION INTRAVENOUS at 16:13

## 2024-07-12 RX ADMIN — PROPOFOL 150 MG: 10 INJECTION, EMULSION INTRAVENOUS at 16:19

## 2024-07-12 RX ADMIN — FAMOTIDINE 20 MG: 10 INJECTION, SOLUTION INTRAVENOUS at 16:13

## 2024-07-12 RX ADMIN — ONDANSETRON 4 MG: 2 INJECTION INTRAMUSCULAR; INTRAVENOUS at 16:13

## 2024-07-12 RX ADMIN — DEXTROSE AND SODIUM CHLORIDE 125 ML/HR: 5; 900 INJECTION, SOLUTION INTRAVENOUS at 14:31

## 2024-07-12 RX ADMIN — GENTAMICIN SULFATE 80 MG: 80 INJECTION, SOLUTION INTRAVENOUS at 16:13

## 2024-07-12 RX ADMIN — SCOPALAMINE 1 PATCH: 1 PATCH, EXTENDED RELEASE TRANSDERMAL at 14:22

## 2024-07-12 RX ADMIN — MIDAZOLAM HYDROCHLORIDE 2 MG: 1 INJECTION, SOLUTION INTRAMUSCULAR; INTRAVENOUS at 16:13

## 2024-07-12 NOTE — ANESTHESIA PROCEDURE NOTES
Airway  Urgency: elective    Date/Time: 7/12/2024 4:19 PM  Airway not difficult    General Information and Staff    Patient location during procedure: OR  CRNA/CAA: Shobha Hurtado CRNA    Indications and Patient Condition  Indications: Standard airway maintenance.     Preoxygenated: yes  MILS not maintained throughout  Mask difficulty assessment: 0 - not attempted    Final Airway Details  Final airway type: supraglottic airway      Successful airway: unique  Size 4     Number of attempts at approach: 1  Assessment: lips, teeth, and gum same as pre-op and atraumatic intubation    Additional Comments  Atraumatic LMA placement, dentition unchanged.

## 2024-07-12 NOTE — ANESTHESIA PREPROCEDURE EVALUATION
Anesthesia Evaluation     Patient summary reviewed and Nursing notes reviewed   history of anesthetic complications:  PONV  NPO Solid Status: > 8 hours             Airway   Mallampati: II  TM distance: >3 FB  Neck ROM: full  No difficulty expected  Dental - normal exam     Pulmonary - negative pulmonary ROS and normal exam    breath sounds clear to auscultation  (+) asthma,shortness of breath, sleep apnea  Cardiovascular - normal exam  Exercise tolerance: good (4-7 METS)    NYHA Classification: II  Rhythm: regular  Rate: normal    (+) hypertension, hyperlipidemia      Neuro/Psych- negative ROS  GI/Hepatic/Renal/Endo    (+) GERD, diabetes mellitus    Musculoskeletal (-) negative ROS    Abdominal  - normal exam    Abdomen: soft.   Substance History - negative use     OB/GYN negative ob/gyn ROS         Other   arthritis,   history of cancer remission                  Anesthesia Plan    ASA 2     general     intravenous induction     Anesthetic plan, risks, benefits, and alternatives have been provided, discussed and informed consent has been obtained with: patient.

## 2024-07-12 NOTE — ANESTHESIA POSTPROCEDURE EVALUATION
Patient: Marisol Overton    Procedure Summary       Date: 07/12/24 Room / Location:  [facility] OR 06 /  [facility] OR    Anesthesia Start: 1613 Anesthesia Stop: 1637    Procedure: URETEROSCOPY LASER LITHOTRIPSY (Right) Diagnosis:       Right ureteral stone      (Right ureteral stone [N20.1])    Surgeons: Petr Pedraza MD Provider: Alberto Kinney MD    Anesthesia Type: general ASA Status: 2            Anesthesia Type: general    Vitals  Vitals Value Taken Time   /71 07/12/24 1640   Temp 97.1 °F (36.2 °C) 07/12/24 1640   Pulse 97 07/12/24 1644   Resp 16 07/12/24 1640   SpO2 100 % 07/12/24 1644   Vitals shown include unfiled device data.        Post Anesthesia Care and Evaluation    Patient location during evaluation: PACU  Patient participation: complete - patient participated  Level of consciousness: responsive to verbal stimuli  Pain score: 0    Airway patency: patent  Anesthetic complications: No anesthetic complications  PONV Status: none  Cardiovascular status: hemodynamically stable  Respiratory status: nasal cannula  Hydration status: acceptable

## 2024-07-12 NOTE — OP NOTE
Marisol Overton  2024    Pre-op Diagnosis:   Right ureteral stone [N20.1]    Post-op Diagnosis:     Post-Op Diagnosis Codes:     * Right ureteral stone [N20.1]    Procedure/CPT® Codes:  48-year-old white female with a distal right ureteral calculus for ureteroscopy following an informed consent brought the procedure suite prepped and draped in a low dorsolithotomy position I utilized the Gross 4.5 [place] ureteroscope identified a stone  at the right orifice I used the laser break up the stone and allow access to the distal ureter  And then followed up the distal ureter broke it numerous small pieces and extracted each piece sequentially.  I was able to pass the ureteroscope to the renal pelvis there was no extravasation no problems whatsoever I chose not to leave a stent    Procedure(s):  URETEROSCOPY   LASER LITHOTRIPSY  Stone basket extraction  Fluoroscopy less than 30 minutes  Surgeon(s):  Petr Pedraza MD    Anesthesia: see anesthesia record    Staff:   Circulator: Adriana Fay RN  Scrub Person: Caprice Jha LPN; Anh Rouse    Estimated Blood Loss: none  Urine Voided: * No values recorded between 2024  4:15 PM and 2024  4:36 PM *    Specimens:                ID Type Source Tests Collected by Time   1 :  Calculus Ureter, Right STONE ANALYSIS Petr Pedraza MD 2024 1630         Drains: None    Findings: Calcium oxalate dihydrate stone    Blood: N/A    Complications: None    Grafts and Implants: None    Petr Pedraza MD     Date: 2024  Time: 16:40 EDT

## 2024-07-23 LAB
CALCIUM OXALATE DIHYDRATE MFR STONE IR: 90 %
COLOR STONE: NORMAL
COM MFR STONE: 10 %
COMPN STONE: NORMAL
LABORATORY COMMENT REPORT: NORMAL
Lab: NORMAL
Lab: NORMAL
PHOTO: NORMAL
SIZE STONE: NORMAL MM
SPEC SOURCE SUBJ: NORMAL
WT STONE: 14 MG

## 2024-11-19 ENCOUNTER — TRANSCRIBE ORDERS (OUTPATIENT)
Dept: ADMINISTRATIVE | Facility: HOSPITAL | Age: 49
End: 2024-11-19
Payer: COMMERCIAL

## 2024-11-19 DIAGNOSIS — Z12.31 VISIT FOR SCREENING MAMMOGRAM: Primary | ICD-10-CM

## 2024-11-21 ENCOUNTER — OFFICE VISIT (OUTPATIENT)
Age: 49
End: 2024-11-21

## 2024-11-21 VITALS
DIASTOLIC BLOOD PRESSURE: 96 MMHG | BODY MASS INDEX: 34.69 KG/M2 | HEART RATE: 66 BPM | HEIGHT: 69 IN | TEMPERATURE: 98.1 F | OXYGEN SATURATION: 94 % | WEIGHT: 234.2 LBS | SYSTOLIC BLOOD PRESSURE: 142 MMHG

## 2024-11-21 DIAGNOSIS — F90.9 ATTENTION DEFICIT HYPERACTIVITY DISORDER (ADHD), UNSPECIFIED ADHD TYPE: Chronic | ICD-10-CM

## 2024-11-21 DIAGNOSIS — U09.9 COVID-19 LONG HAULER: ICD-10-CM

## 2024-11-21 DIAGNOSIS — G47.33 OSA (OBSTRUCTIVE SLEEP APNEA): ICD-10-CM

## 2024-11-21 DIAGNOSIS — R73.03 PREDIABETES: ICD-10-CM

## 2024-11-21 DIAGNOSIS — Z76.89 ESTABLISHING CARE WITH NEW DOCTOR, ENCOUNTER FOR: Primary | ICD-10-CM

## 2024-11-21 DIAGNOSIS — M79.7 FIBROMYALGIA: ICD-10-CM

## 2024-11-21 DIAGNOSIS — Z12.12 SCREENING FOR COLORECTAL CANCER: ICD-10-CM

## 2024-11-21 DIAGNOSIS — I10 PRIMARY HYPERTENSION: ICD-10-CM

## 2024-11-21 DIAGNOSIS — E04.1 THYROID NODULE: Chronic | ICD-10-CM

## 2024-11-21 DIAGNOSIS — D50.9 IRON DEFICIENCY ANEMIA, UNSPECIFIED IRON DEFICIENCY ANEMIA TYPE: ICD-10-CM

## 2024-11-21 DIAGNOSIS — M50.30 DDD (DEGENERATIVE DISC DISEASE), CERVICAL: ICD-10-CM

## 2024-11-21 DIAGNOSIS — F31.9 BIPOLAR 1 DISORDER (HCC): Chronic | ICD-10-CM

## 2024-11-21 DIAGNOSIS — Z11.59 ENCOUNTER FOR HEPATITIS C SCREENING TEST FOR LOW RISK PATIENT: ICD-10-CM

## 2024-11-21 DIAGNOSIS — Z11.4 SCREENING FOR HIV (HUMAN IMMUNODEFICIENCY VIRUS): ICD-10-CM

## 2024-11-21 DIAGNOSIS — Z12.11 SCREENING FOR COLORECTAL CANCER: ICD-10-CM

## 2024-11-21 DIAGNOSIS — B00.2 RECURRENT ORAL HERPES SIMPLEX: ICD-10-CM

## 2024-11-21 DIAGNOSIS — G25.81 RESTLESS LEG SYNDROME: ICD-10-CM

## 2024-11-21 DIAGNOSIS — Z12.31 ENCOUNTER FOR SCREENING MAMMOGRAM FOR BREAST CANCER: ICD-10-CM

## 2024-11-21 DIAGNOSIS — F41.9 ANXIETY: ICD-10-CM

## 2024-11-21 DIAGNOSIS — F60.3 BORDERLINE PERSONALITY DISORDER (HCC): Chronic | ICD-10-CM

## 2024-11-21 DIAGNOSIS — R91.8 PULMONARY NODULES: ICD-10-CM

## 2024-11-21 DIAGNOSIS — F43.10 POSTTRAUMATIC STRESS DISORDER: Chronic | ICD-10-CM

## 2024-11-21 DIAGNOSIS — E78.1 HYPERTRIGLYCERIDEMIA: ICD-10-CM

## 2024-11-21 PROBLEM — E66.01 OBESITY, CLASS III, BMI 40-49.9 (MORBID OBESITY): Status: RESOLVED | Noted: 2021-04-14 | Resolved: 2024-11-21

## 2024-11-21 PROBLEM — G47.00 INSOMNIA: Status: ACTIVE | Noted: 2024-11-21

## 2024-11-21 PROBLEM — E66.813 OBESITY, CLASS III, BMI 40-49.9 (MORBID OBESITY): Status: RESOLVED | Noted: 2021-04-14 | Resolved: 2024-11-21

## 2024-11-21 PROBLEM — Z90.710 HX OF TOTAL HYSTERECTOMY: Chronic | Status: ACTIVE | Noted: 2017-09-13

## 2024-11-21 PROBLEM — M50.223 HERNIATION OF INTERVERTEBRAL DISC AT C6-C7 LEVEL: Status: RESOLVED | Noted: 2018-10-31 | Resolved: 2024-11-21

## 2024-11-21 PROBLEM — E05.90 SUBCLINICAL HYPERTHYROIDISM: Status: ACTIVE | Noted: 2024-11-21

## 2024-11-21 PROBLEM — J30.9 ALLERGIC RHINITIS: Status: ACTIVE | Noted: 2024-11-21

## 2024-11-21 RX ORDER — ALPRAZOLAM 0.25 MG/1
0.25 TABLET ORAL PRN
COMMUNITY
Start: 2024-09-13

## 2024-11-21 RX ORDER — PREGABALIN 75 MG/1
75 CAPSULE ORAL 2 TIMES DAILY
COMMUNITY
Start: 2024-08-06

## 2024-11-21 RX ORDER — ALBUTEROL SULFATE 90 UG/1
1 INHALANT RESPIRATORY (INHALATION) AS NEEDED
COMMUNITY
Start: 2024-10-15

## 2024-11-21 RX ORDER — ROPINIROLE 0.25 MG/1
0.25 TABLET, FILM COATED ORAL NIGHTLY
Qty: 90 TABLET | Refills: 3 | Status: SHIPPED | OUTPATIENT
Start: 2024-11-21

## 2024-11-21 RX ORDER — IBUPROFEN 800 MG/1
800 TABLET, FILM COATED ORAL DAILY
COMMUNITY
Start: 2024-08-01

## 2024-11-21 RX ORDER — VALACYCLOVIR HYDROCHLORIDE 1 G/1
TABLET, FILM COATED ORAL
Qty: 30 TABLET | Refills: 5 | Status: SHIPPED | OUTPATIENT
Start: 2024-11-21

## 2024-11-21 RX ORDER — LISINOPRIL 40 MG/1
40 TABLET ORAL DAILY
Qty: 30 TABLET | Refills: 5 | Status: SHIPPED | OUTPATIENT
Start: 2024-11-21

## 2024-11-21 SDOH — ECONOMIC STABILITY: FOOD INSECURITY: WITHIN THE PAST 12 MONTHS, THE FOOD YOU BOUGHT JUST DIDN'T LAST AND YOU DIDN'T HAVE MONEY TO GET MORE.: NEVER TRUE

## 2024-11-21 SDOH — ECONOMIC STABILITY: FOOD INSECURITY: WITHIN THE PAST 12 MONTHS, YOU WORRIED THAT YOUR FOOD WOULD RUN OUT BEFORE YOU GOT MONEY TO BUY MORE.: NEVER TRUE

## 2024-11-21 SDOH — HEALTH STABILITY: PHYSICAL HEALTH
ON AVERAGE, HOW MANY DAYS PER WEEK DO YOU ENGAGE IN MODERATE TO STRENUOUS EXERCISE (LIKE A BRISK WALK)?: PATIENT DECLINED

## 2024-11-21 SDOH — ECONOMIC STABILITY: INCOME INSECURITY: HOW HARD IS IT FOR YOU TO PAY FOR THE VERY BASICS LIKE FOOD, HOUSING, MEDICAL CARE, AND HEATING?: NOT HARD AT ALL

## 2024-11-21 ASSESSMENT — ENCOUNTER SYMPTOMS
BLOOD IN STOOL: 0
COUGH: 1
ABDOMINAL PAIN: 1
DIARRHEA: 0
TROUBLE SWALLOWING: 0
SHORTNESS OF BREATH: 1
CONSTIPATION: 0

## 2024-11-21 ASSESSMENT — PATIENT HEALTH QUESTIONNAIRE - PHQ9
2. FEELING DOWN, DEPRESSED OR HOPELESS: NOT AT ALL
SUM OF ALL RESPONSES TO PHQ QUESTIONS 1-9: 0
1. LITTLE INTEREST OR PLEASURE IN DOING THINGS: NOT AT ALL
SUM OF ALL RESPONSES TO PHQ QUESTIONS 1-9: 0
SUM OF ALL RESPONSES TO PHQ QUESTIONS 1-9: 0
SUM OF ALL RESPONSES TO PHQ9 QUESTIONS 1 & 2: 0
SUM OF ALL RESPONSES TO PHQ QUESTIONS 1-9: 0

## 2024-11-21 NOTE — PROGRESS NOTES
RM14    Chief Complaint   Patient presents with    New Patient       Vitals:    11/21/24 1416 11/21/24 1427   BP: (!) 149/92 (!) 142/96   Site: Right Upper Arm Left Upper Arm   Position: Sitting Sitting   Pulse: 66    Temp: 98.1 °F (36.7 °C)    TempSrc: Oral    SpO2: 94%    Weight: 106.2 kg (234 lb 3.2 oz)    Height: 1.753 m (5' 9\")        \"Have you been to the ER, urgent care clinic since your last visit?  Hospitalized since your last visit?\"    NO    “Have you seen or consulted any other health care providers outside of [facility] since your last visit?”    NO    Have you had a mammogram?”   NO    Date of last Mammogram: 2/10/2021      “Have you had a pap smear?”    NO    No cervical cancer screening on file         “Have you had a colorectal cancer screening such as a colonoscopy/FIT/Cologuard?    NO    No colonoscopy on file  No cologuard on file  No FIT/FOBT on file   No flexible sigmoidoscopy on file         Click Here for Release of Records Request   AVS  education, follow up, and recommendations provided and addressed with patient.  services used to advise patient No

## 2024-11-21 NOTE — PATIENT INSTRUCTIONS
[email or web address]    [facility] [address] [place], [place] [id number] [phone]

## 2024-11-21 NOTE — PROGRESS NOTES
Priya Villasenor (:  1975) is a 48 y.o. female, New patient, here for evaluation of the following chief complaint(s):  New Patient        Subjective   SUBJECTIVE/OBJECTIVE:  Patient presents today to establish care with me    Patient does have prior PCP  But has not seen  with Dr. You      Chronic problems:    Hypertension  - BP Today: 142/96  - Medications: Lisinopril 20 mg dialy      - Compliance: yes, no issues    Hypertriglyceridemia: Noted on prior lipid panel. Not on medicaiton    Prediabetes: Last A1c 5.9.     Obesity - BMI 34.59 - history of weight loss surgery    LUCILA - not currently on CPAP    Post-COVID symptoms  - Swelling in feet, numbness - going up left side of body  - Has seen orthopedic provider -thinking it was a back issue - had MRI, EMG/NCS that was normal  - He has appointment upcoming with neurology - looking at possible neuropathy or MS    Thyroid nodule - US  and  both show TIRAD 3-4 nodules. No history of biopsy for this    Bipolar 1, PTSD, Borderline PD, ADHD, anxiety - has not established with psychiatry, but used to see someone.     DDD - cervical - [facility]    Fibromyalgia - on Lyrica    Restless leg syndrome - was on Requip but does not have currently    Oral herpes - recurrently - needs new script for Valtrex    Preventative care:  Health Maintenance Due   Topic Date Due    HIV screen  Never done    Hepatitis C screen  Never done    Hepatitis B vaccine (1 of 3 - 19+ 3-dose series) Never done    Lipids  Never done    Colorectal Cancer Screen  Never done    Breast cancer screen  02/10/2023          Past Medical History:   Diagnosis Date    ADHD (attention deficit hyperactivity disorder)     Anxiety     Bipolar disorder (HCC) 2015    Depression     Diabetes (HCC)     Fibromyalgia 2019    Headache     Herniation of intervertebral disc at C6-C7 level 10/31/2018    Hypertension     Obesity, Class III, BMI 40-49.9 (morbid obesity) 2021    Restless legs

## 2024-11-22 LAB
ALBUMIN SERPL-MCNC: 4.2 G/DL (ref 3.9–4.9)
ALP SERPL-CCNC: 91 IU/L (ref 44–121)
ALT SERPL-CCNC: 14 IU/L (ref 0–32)
AST SERPL-CCNC: 20 IU/L (ref 0–40)
BASOPHILS # BLD AUTO: 0 X10E3/UL (ref 0–0.2)
BASOPHILS NFR BLD AUTO: 1 %
BILIRUB SERPL-MCNC: 0.2 MG/DL (ref 0–1.2)
BUN SERPL-MCNC: 11 MG/DL (ref 6–24)
BUN/CREAT SERPL: 15 (ref 9–23)
CALCIUM SERPL-MCNC: 9.4 MG/DL (ref 8.7–10.2)
CHLORIDE SERPL-SCNC: 104 MMOL/L (ref 96–106)
CHOLEST SERPL-MCNC: 131 MG/DL (ref 100–199)
CO2 SERPL-SCNC: 23 MMOL/L (ref 20–29)
CREAT SERPL-MCNC: 0.73 MG/DL (ref 0.57–1)
EOSINOPHIL # BLD AUTO: 0.1 X10E3/UL (ref 0–0.4)
EOSINOPHIL NFR BLD AUTO: 2 %
ERYTHROCYTE [DISTWIDTH] IN BLOOD BY AUTOMATED COUNT: 15.4 % (ref 11.7–15.4)
FERRITIN SERPL-MCNC: 15 NG/ML (ref 15–150)
GLOBULIN SER CALC-MCNC: 2.6 G/DL (ref 1.5–4.5)
GLUCOSE SERPL-MCNC: 102 MG/DL (ref 70–99)
HBA1C MFR BLD: 6.1 % (ref 4.8–5.6)
HCT VFR BLD AUTO: 44.3 % (ref 34–46.6)
HCV AB SERPL QL IA: NORMAL
HCV IGG SERPL QL IA: NON REACTIVE
HDLC SERPL-MCNC: 48 MG/DL
HGB BLD-MCNC: 14.1 G/DL (ref 11.1–15.9)
HIV 1+2 AB+HIV1 P24 AG SERPL QL IA: NON REACTIVE
IMM GRANULOCYTES # BLD AUTO: 0 X10E3/UL (ref 0–0.1)
IMM GRANULOCYTES NFR BLD AUTO: 0 %
IRON SATN MFR SERPL: 11 % (ref 15–55)
IRON SERPL-MCNC: 34 UG/DL (ref 27–159)
LDLC SERPL CALC-MCNC: 56 MG/DL (ref 0–99)
LYMPHOCYTES # BLD AUTO: 2.8 X10E3/UL (ref 0.7–3.1)
LYMPHOCYTES NFR BLD AUTO: 46 %
MCH RBC QN AUTO: 25.3 PG (ref 26.6–33)
MCHC RBC AUTO-ENTMCNC: 31.8 G/DL (ref 31.5–35.7)
MCV RBC AUTO: 79 FL (ref 79–97)
MONOCYTES # BLD AUTO: 0.4 X10E3/UL (ref 0.1–0.9)
MONOCYTES NFR BLD AUTO: 7 %
NEUTROPHILS # BLD AUTO: 2.6 X10E3/UL (ref 1.4–7)
NEUTROPHILS NFR BLD AUTO: 44 %
PLATELET # BLD AUTO: 235 X10E3/UL (ref 150–450)
POTASSIUM SERPL-SCNC: 3.7 MMOL/L (ref 3.5–5.2)
PROT SERPL-MCNC: 6.8 G/DL (ref 6–8.5)
RBC # BLD AUTO: 5.58 X10E6/UL (ref 3.77–5.28)
SODIUM SERPL-SCNC: 141 MMOL/L (ref 134–144)
T4 FREE SERPL-MCNC: 0.92 NG/DL (ref 0.82–1.77)
TIBC SERPL-MCNC: 322 UG/DL (ref 250–450)
TRIGL SERPL-MCNC: 158 MG/DL (ref 0–149)
TSH SERPL DL<=0.005 MIU/L-ACNC: 0.29 UIU/ML (ref 0.45–4.5)
UIBC SERPL-MCNC: 288 UG/DL (ref 131–425)
VLDLC SERPL CALC-MCNC: 27 MG/DL (ref 5–40)
WBC # BLD AUTO: 5.9 X10E3/UL (ref 3.4–10.8)

## 2024-12-03 ENCOUNTER — HOSPITAL ENCOUNTER (OUTPATIENT)
Facility: HOSPITAL | Age: 49
Discharge: HOME | End: 2024-12-03
Admitting: NURSE PRACTITIONER
Payer: COMMERCIAL

## 2024-12-03 DIAGNOSIS — Z12.31 VISIT FOR SCREENING MAMMOGRAM: ICD-10-CM

## 2024-12-03 PROCEDURE — 77067 SCR MAMMO BI INCL CAD: CPT

## 2024-12-03 PROCEDURE — 77063 BREAST TOMOSYNTHESIS BI: CPT

## 2024-12-05 ENCOUNTER — HOSPITAL ENCOUNTER (OUTPATIENT)
Facility: HOSPITAL | Age: 49
Discharge: HOME | End: 2024-12-08
Attending: STUDENT IN AN ORGANIZED HEALTH CARE EDUCATION/TRAINING PROGRAM
Payer: MEDICAID

## 2024-12-05 DIAGNOSIS — E04.1 THYROID NODULE: Chronic | ICD-10-CM

## 2024-12-05 PROCEDURE — 76536 US EXAM OF HEAD AND NECK: CPT

## 2024-12-30 ENCOUNTER — TRANSCRIBE ORDERS (OUTPATIENT)
Dept: ADMINISTRATIVE | Facility: HOSPITAL | Age: 49
End: 2024-12-30
Payer: COMMERCIAL

## 2024-12-30 ENCOUNTER — HOSPITAL ENCOUNTER (OUTPATIENT)
Dept: GENERAL RADIOLOGY | Facility: HOSPITAL | Age: 49
Discharge: HOME | End: 2024-12-30
Admitting: NURSE PRACTITIONER
Payer: COMMERCIAL

## 2024-12-30 DIAGNOSIS — M25.552 LEFT HIP PAIN: ICD-10-CM

## 2024-12-30 DIAGNOSIS — M25.552 LEFT HIP PAIN: Primary | ICD-10-CM

## 2024-12-30 PROCEDURE — 73503 X-RAY EXAM HIP UNI 4/> VIEWS: CPT

## 2025-01-08 ENCOUNTER — HOSPITAL ENCOUNTER (OUTPATIENT)
Facility: HOSPITAL | Age: 50
Discharge: HOME | End: 2025-01-11
Attending: STUDENT IN AN ORGANIZED HEALTH CARE EDUCATION/TRAINING PROGRAM
Payer: MEDICAID

## 2025-01-08 VITALS — WEIGHT: 234 LBS | HEIGHT: 69 IN | BODY MASS INDEX: 34.66 KG/M2

## 2025-01-08 DIAGNOSIS — Z12.31 ENCOUNTER FOR SCREENING MAMMOGRAM FOR BREAST CANCER: ICD-10-CM

## 2025-01-08 PROCEDURE — 77067 SCR MAMMO BI INCL CAD: CPT

## 2025-01-09 ENCOUNTER — TELEMEDICINE (OUTPATIENT)
Age: 50
End: 2025-01-09

## 2025-01-09 DIAGNOSIS — G25.81 RESTLESS LEG SYNDROME: ICD-10-CM

## 2025-01-09 DIAGNOSIS — U09.9 COVID-19 LONG HAULER: ICD-10-CM

## 2025-01-09 DIAGNOSIS — R91.8 PULMONARY NODULES: ICD-10-CM

## 2025-01-09 DIAGNOSIS — F43.10 POSTTRAUMATIC STRESS DISORDER: ICD-10-CM

## 2025-01-09 DIAGNOSIS — F60.3 BORDERLINE PERSONALITY DISORDER (HCC): ICD-10-CM

## 2025-01-09 DIAGNOSIS — E04.1 THYROID NODULE: ICD-10-CM

## 2025-01-09 DIAGNOSIS — B00.2 RECURRENT ORAL HERPES SIMPLEX: ICD-10-CM

## 2025-01-09 DIAGNOSIS — F31.9 BIPOLAR 1 DISORDER (HCC): ICD-10-CM

## 2025-01-09 DIAGNOSIS — F41.9 ANXIETY: ICD-10-CM

## 2025-01-09 DIAGNOSIS — M79.7 FIBROMYALGIA: ICD-10-CM

## 2025-01-09 DIAGNOSIS — E78.1 HYPERTRIGLYCERIDEMIA: ICD-10-CM

## 2025-01-09 DIAGNOSIS — G47.33 OSA (OBSTRUCTIVE SLEEP APNEA): ICD-10-CM

## 2025-01-09 DIAGNOSIS — R73.03 PREDIABETES: ICD-10-CM

## 2025-01-09 DIAGNOSIS — F90.9 ATTENTION DEFICIT HYPERACTIVITY DISORDER (ADHD), UNSPECIFIED ADHD TYPE: ICD-10-CM

## 2025-01-09 DIAGNOSIS — M50.30 DDD (DEGENERATIVE DISC DISEASE), CERVICAL: ICD-10-CM

## 2025-01-09 DIAGNOSIS — I10 PRIMARY HYPERTENSION: Primary | ICD-10-CM

## 2025-01-09 DIAGNOSIS — D50.9 IRON DEFICIENCY ANEMIA, UNSPECIFIED IRON DEFICIENCY ANEMIA TYPE: ICD-10-CM

## 2025-01-09 RX ORDER — PREGABALIN 150 MG/1
150 CAPSULE ORAL 2 TIMES DAILY
Qty: 60 CAPSULE | Refills: 0 | Status: SHIPPED | OUTPATIENT
Start: 2025-01-09 | End: 2025-02-08

## 2025-01-09 RX ORDER — HYDROXYZINE HYDROCHLORIDE 25 MG/1
25 TABLET, FILM COATED ORAL EVERY 8 HOURS PRN
Qty: 90 TABLET | Refills: 2 | Status: SHIPPED | OUTPATIENT
Start: 2025-01-09

## 2025-01-09 SDOH — ECONOMIC STABILITY: INCOME INSECURITY: IN THE LAST 12 MONTHS, WAS THERE A TIME WHEN YOU WERE NOT ABLE TO PAY THE MORTGAGE OR RENT ON TIME?: NO

## 2025-01-09 SDOH — ECONOMIC STABILITY: FOOD INSECURITY: WITHIN THE PAST 12 MONTHS, YOU WORRIED THAT YOUR FOOD WOULD RUN OUT BEFORE YOU GOT MONEY TO BUY MORE.: NEVER TRUE

## 2025-01-09 SDOH — ECONOMIC STABILITY: TRANSPORTATION INSECURITY
IN THE PAST 12 MONTHS, HAS LACK OF TRANSPORTATION KEPT YOU FROM MEETINGS, WORK, OR FROM GETTING THINGS NEEDED FOR DAILY LIVING?: NO

## 2025-01-09 SDOH — ECONOMIC STABILITY: FOOD INSECURITY: WITHIN THE PAST 12 MONTHS, THE FOOD YOU BOUGHT JUST DIDN'T LAST AND YOU DIDN'T HAVE MONEY TO GET MORE.: NEVER TRUE

## 2025-01-09 SDOH — ECONOMIC STABILITY: TRANSPORTATION INSECURITY
IN THE PAST 12 MONTHS, HAS THE LACK OF TRANSPORTATION KEPT YOU FROM MEDICAL APPOINTMENTS OR FROM GETTING MEDICATIONS?: NO

## 2025-01-09 ASSESSMENT — PATIENT HEALTH QUESTIONNAIRE - PHQ9
2. FEELING DOWN, DEPRESSED OR HOPELESS: NOT AT ALL
SUM OF ALL RESPONSES TO PHQ QUESTIONS 1-9: 0
SUM OF ALL RESPONSES TO PHQ QUESTIONS 1-9: 0
SUM OF ALL RESPONSES TO PHQ9 QUESTIONS 1 & 2: 0
SUM OF ALL RESPONSES TO PHQ QUESTIONS 1-9: 0
1. LITTLE INTEREST OR PLEASURE IN DOING THINGS: NOT AT ALL
SUM OF ALL RESPONSES TO PHQ QUESTIONS 1-9: 0

## 2025-01-09 ASSESSMENT — ENCOUNTER SYMPTOMS
SHORTNESS OF BREATH: 0
ABDOMINAL PAIN: 0
VOMITING: 0
COUGH: 0
DIARRHEA: 0
CONSTIPATION: 0
BLOOD IN STOOL: 0
NAUSEA: 0

## 2025-01-09 NOTE — ASSESSMENT & PLAN NOTE
Orders:    [facility] - Brittney Camarena NP, Behavioral Health, [place]

## 2025-01-09 NOTE — ASSESSMENT & PLAN NOTE
Orders:    [facility] - Brittney Camarena NP, Behavioral Health, [place]

## 2025-01-09 NOTE — ASSESSMENT & PLAN NOTE
Orders:    [facility] - Brittney Camarena NP, Behavioral Health, [place]

## 2025-01-09 NOTE — ASSESSMENT & PLAN NOTE
Orders:    [facility] - Brittney Camarena, NP, Behavioral Health, [place]    hydrOXYzine HCl (ATARAX) 25 MG tablet; Take 1 tablet by mouth every 8 hours as needed for Itching

## 2025-01-09 NOTE — ASSESSMENT & PLAN NOTE
Orders:    pregabalin (LYRICA) 150 MG capsule; Take 1 capsule by mouth 2 times daily for 30 days. Max Daily Amount: 300 mg

## 2025-01-09 NOTE — ASSESSMENT & PLAN NOTE
Orders:    [facility] - Brittney Camarena NP, Behavioral Health, [place]

## 2025-01-09 NOTE — PROGRESS NOTES
Visit Type:  Virtual Visit    Chief Complaint   Patient presents with    Follow-up        There were no vitals filed for this visit.   Health Maintenance Due   Topic Date Due    Hepatitis B vaccine (1 of 3 - 19+ 3-dose series) Never done    Colorectal Cancer Screen  Never done    Breast cancer screen  02/10/2023        1. \"Have you been to the ER, urgent care clinic since your last visit?  Hospitalized since your last visit?\" No    2. \"Have you seen or consulted any other health care providers outside of the [facility] since your last visit?\" No         Future Appointments:  Future Appointments   Date Time Provider Department Center   1/9/2025  4:30 PM Lorenzo Ferrer MD CP [facility]iatric DEP   5/19/2025  8:40 AM Anitha Hurst, ANP [facility] BS [facility]        Last Appointment With Me:  11/21/2024     Requested Prescriptions      No prescriptions requested or ordered in this encounter

## 2025-01-09 NOTE — PROGRESS NOTES
Priya Villasenor, was evaluated through a synchronous (real-time) audio-video encounter. The patient (or guardian if applicable) is aware that this is a billable service, which includes applicable co-pays. This Virtual Visit was conducted with patient's (and/or legal guardian's) consent. Patient identification was verified, and a caregiver was present when appropriate.   The patient was located at Other: [place]  Provider was located at Facility (Appt Dept): [id number] [place] [id number]  Confirm you are appropriately licensed, registered, or certified to deliver care in the state where the patient is located as indicated above. If you are not or unsure, please re-schedule the visit: Yes, I confirm.     Priya Villasenor (:  1975) is a Established patient, presenting virtually for evaluation of the following:      Below is the assessment and plan developed based on review of pertinent history, physical exam, labs, studies, and medications.     Assessment & Plan  Primary hypertension            Hypertriglyceridemia            Prediabetes            LUCILA (obstructive sleep apnea)            Thyroid nodule            COVID-19 long hauler            Pulmonary nodules            Iron deficiency anemia, unspecified iron deficiency anemia type            Bipolar 1 disorder (HCC)       Orders:    Brittney Pacheco NP, Behavioral [facility]    Posttraumatic stress disorder       Orders:    Brittney Pacheco NP, Behavioral Health, [place]    Borderline personality disorder (HCC)       Orders:    Brittney Pacheco NP, Behavioral Health, [place]    Attention deficit hyperactivity disorder (ADHD), unspecified ADHD type       Orders:    Brittney Pacheco NP, Behavioral Health, [place]    Anxiety       Orders:    Brittney Pacheco NP, Behavioral Health, [place]    hydrOXYzine HCl (ATARAX) 25 MG

## 2025-01-10 DIAGNOSIS — R92.8 ABNORMAL MAMMOGRAM OF RIGHT BREAST: Primary | ICD-10-CM

## 2025-01-17 ENCOUNTER — HOSPITAL ENCOUNTER (OUTPATIENT)
Facility: HOSPITAL | Age: 50
Discharge: HOME | End: 2025-01-20
Attending: STUDENT IN AN ORGANIZED HEALTH CARE EDUCATION/TRAINING PROGRAM
Payer: MEDICAID

## 2025-01-17 DIAGNOSIS — R92.8 ABNORMAL MAMMOGRAM: ICD-10-CM

## 2025-01-17 PROCEDURE — 76642 ULTRASOUND BREAST LIMITED: CPT

## 2025-01-17 PROCEDURE — G0279 TOMOSYNTHESIS, MAMMO: HCPCS

## 2025-02-06 ENCOUNTER — OFFICE VISIT (OUTPATIENT)
Age: 50
End: 2025-02-06
Payer: MEDICAID

## 2025-02-06 ENCOUNTER — TELEPHONE (OUTPATIENT)
Age: 50
End: 2025-02-06

## 2025-02-06 VITALS
BODY MASS INDEX: 32.55 KG/M2 | SYSTOLIC BLOOD PRESSURE: 149 MMHG | HEART RATE: 53 BPM | TEMPERATURE: 97.6 F | OXYGEN SATURATION: 96 % | DIASTOLIC BLOOD PRESSURE: 94 MMHG | WEIGHT: 220.4 LBS

## 2025-02-06 DIAGNOSIS — R73.03 PREDIABETES: ICD-10-CM

## 2025-02-06 DIAGNOSIS — I10 PRIMARY HYPERTENSION: ICD-10-CM

## 2025-02-06 DIAGNOSIS — F90.9 ATTENTION DEFICIT HYPERACTIVITY DISORDER (ADHD), UNSPECIFIED ADHD TYPE: ICD-10-CM

## 2025-02-06 DIAGNOSIS — F31.9 BIPOLAR 1 DISORDER (HCC): ICD-10-CM

## 2025-02-06 DIAGNOSIS — G47.33 OSA (OBSTRUCTIVE SLEEP APNEA): ICD-10-CM

## 2025-02-06 DIAGNOSIS — Z51.81 MEDICATION MONITORING ENCOUNTER: ICD-10-CM

## 2025-02-06 DIAGNOSIS — E78.1 HYPERTRIGLYCERIDEMIA: ICD-10-CM

## 2025-02-06 DIAGNOSIS — E04.1 THYROID NODULE: ICD-10-CM

## 2025-02-06 DIAGNOSIS — M50.30 DDD (DEGENERATIVE DISC DISEASE), CERVICAL: ICD-10-CM

## 2025-02-06 DIAGNOSIS — F60.3 BORDERLINE PERSONALITY DISORDER (HCC): ICD-10-CM

## 2025-02-06 DIAGNOSIS — B00.2 RECURRENT ORAL HERPES SIMPLEX: ICD-10-CM

## 2025-02-06 DIAGNOSIS — D50.9 IRON DEFICIENCY ANEMIA, UNSPECIFIED IRON DEFICIENCY ANEMIA TYPE: ICD-10-CM

## 2025-02-06 DIAGNOSIS — Z12.11 SCREENING FOR COLORECTAL CANCER: ICD-10-CM

## 2025-02-06 DIAGNOSIS — F43.10 POSTTRAUMATIC STRESS DISORDER: ICD-10-CM

## 2025-02-06 DIAGNOSIS — R91.8 PULMONARY NODULES: ICD-10-CM

## 2025-02-06 DIAGNOSIS — Z12.12 SCREENING FOR COLORECTAL CANCER: ICD-10-CM

## 2025-02-06 DIAGNOSIS — F41.9 ANXIETY: ICD-10-CM

## 2025-02-06 DIAGNOSIS — M79.7 FIBROMYALGIA: Primary | ICD-10-CM

## 2025-02-06 DIAGNOSIS — U09.9 COVID-19 LONG HAULER: ICD-10-CM

## 2025-02-06 DIAGNOSIS — G25.81 RESTLESS LEG SYNDROME: ICD-10-CM

## 2025-02-06 PROCEDURE — 99214 OFFICE O/P EST MOD 30 MIN: CPT | Performed by: STUDENT IN AN ORGANIZED HEALTH CARE EDUCATION/TRAINING PROGRAM

## 2025-02-06 RX ORDER — LISINOPRIL AND HYDROCHLOROTHIAZIDE 20; 25 MG/1; MG/1
1 TABLET ORAL DAILY
Qty: 90 TABLET | Refills: 1 | Status: SHIPPED | OUTPATIENT
Start: 2025-02-06

## 2025-02-06 RX ORDER — PREGABALIN 150 MG/1
150 CAPSULE ORAL 2 TIMES DAILY
Qty: 60 CAPSULE | Refills: 5 | Status: SHIPPED | OUTPATIENT
Start: 2025-02-06 | End: 2025-08-05

## 2025-02-06 ASSESSMENT — PATIENT HEALTH QUESTIONNAIRE - PHQ9
SUM OF ALL RESPONSES TO PHQ QUESTIONS 1-9: 0
SUM OF ALL RESPONSES TO PHQ QUESTIONS 1-9: 0
1. LITTLE INTEREST OR PLEASURE IN DOING THINGS: NOT AT ALL
SUM OF ALL RESPONSES TO PHQ QUESTIONS 1-9: 0
SUM OF ALL RESPONSES TO PHQ QUESTIONS 1-9: 0
2. FEELING DOWN, DEPRESSED OR HOPELESS: NOT AT ALL
SUM OF ALL RESPONSES TO PHQ9 QUESTIONS 1 & 2: 0

## 2025-02-06 NOTE — PATIENT INSTRUCTIONS
Medications for weight loss, include Saxenda (daily injection), Wegovy (once a week) and Zepbound (once a week).

## 2025-02-06 NOTE — PROGRESS NOTES
Priya Villasenor (:  1975) is a 49 y.o. female, Established patient, here for evaluation of the following chief complaint(s):  Follow-up (She was in [facility] for Rt hand and fingers pain)        Subjective   SUBJECTIVE/OBJECTIVE:  Patient presents today for follow-up for the following:    Hypertension  - BP at home still elevated - per patient she is in pain and anxiety is uncontrolled  - Medications: Lisinopril 40 mg      - Compliance: yes, no issues     Hypertriglyceridemia: Noted on prior lipid panel. Not on medication. Repeat on 24 mildly elevated at 158     Prediabetes: Last A1c 6.0 on 24     Obesity - BMI 34.59 - history of weight loss surgery - she has lost weight and is now down to BMI 32.55     LUCILA - not currently on CPAP     Post-COVID symptoms  - Swelling in feet, numbness - going up left side of body  - Has seen orthopedic provider -thinking it was a back issue - had MRI, EMG/NCS that was normal  - She has appointment upcoming with neurology - looking at possible neuropathy or MS     Thyroid nodule - US  and  both show TIRAD 3-4 nodules. No history of biopsy for this - Repeat US on 24 with multiple nodules but not that warrant biopsies at this time. Recommendation was to repeat in 1 year. Thyroid studies showed TSH of 0.292 but FT4 was normal at 0.92     Bipolar 1, PTSD, Borderline PD, ADHD, anxiety - has not established with psychiatry, but used to see someone.  Was referred to behavioral health previously but has been unable to schedule with anyone.   - ADHD - was on Vyvanse, but has not been on this for 1 year  - For anxiety, she was on Klonopin  - She was also on hydroxyzine.      DDD - cervical - sees [facility]     Fibromyalgia - on Lyrica 150 mg twice a day.     Restless leg syndrome - was on Requip      Oral herpes - recurrently - on Valtrex as needed    Recent hand injury after sledding - seeing orthopedic surgery to them    Preventative care:  Health

## 2025-02-06 NOTE — PROGRESS NOTES
RM 14    Chief Complaint   Patient presents with    Follow-up     She was in [facility] for Rt hand and fingers pain       Vitals:    02/06/25 1409 02/06/25 1413   BP: (!) 140/97 (!) 149/94   Site: Left Upper Arm Right Upper Arm   Position: Sitting Sitting   Pulse: 53    Temp: 97.6 °F (36.4 °C)    TempSrc: Oral    SpO2: 96%    Weight: 100 kg (220 lb 6.4 oz)           \"Have you been to the ER, urgent care clinic since your last visit?  Hospitalized since your last visit?\"    NO    “Have you seen or consulted any other health care providers outside of [facility] since your last visit?”    NO        “Have you had a colorectal cancer screening such as a colonoscopy/FIT/Cologuard?    NO    No colonoscopy on file  No cologuard on file  No FIT/FOBT on file   No flexible sigmoidoscopy on file         Click Here for Release of Records Request   AVS  education, follow up, and recommendations provided and addressed with patient.  services used to advise patient  No

## 2025-02-11 LAB
AMPHETAMINES UR QL SCN: NEGATIVE NG/ML
BARBITURATES UR QL SCN: NEGATIVE NG/ML
BENZODIAZ UR QL: NEGATIVE NG/ML
BZE UR QL: NEGATIVE NG/ML
CANNABINOIDS UR QL CFM: POSITIVE
CANNABINOIDS UR QL SCN: NORMAL NG/ML
MDMA, URINE: NEGATIVE NG/ML
METHADONE UR QL SCN: NEGATIVE NG/ML
METHAQUALONE UR QL: NEGATIVE NG/ML
OPIATES UR QL: NEGATIVE NG/ML
PCP UR QL: NEGATIVE NG/ML
PROPOXYPH UR QL: NEGATIVE NG/ML
THC UR CFM-MCNC: >750 NG/ML

## 2025-03-10 ASSESSMENT — ANXIETY QUESTIONNAIRES
7. FEELING AFRAID AS IF SOMETHING AWFUL MIGHT HAPPEN: SEVERAL DAYS
1. FEELING NERVOUS, ANXIOUS, OR ON EDGE: SEVERAL DAYS
1. FEELING NERVOUS, ANXIOUS, OR ON EDGE: SEVERAL DAYS
5. BEING SO RESTLESS THAT IT IS HARD TO SIT STILL: SEVERAL DAYS
GAD7 TOTAL SCORE: 12
IF YOU CHECKED OFF ANY PROBLEMS ON THIS QUESTIONNAIRE, HOW DIFFICULT HAVE THESE PROBLEMS MADE IT FOR YOU TO DO YOUR WORK, TAKE CARE OF THINGS AT HOME, OR GET ALONG WITH OTHER PEOPLE: SOMEWHAT DIFFICULT
4. TROUBLE RELAXING: MORE THAN HALF THE DAYS
3. WORRYING TOO MUCH ABOUT DIFFERENT THINGS: NEARLY EVERY DAY
2. NOT BEING ABLE TO STOP OR CONTROL WORRYING: MORE THAN HALF THE DAYS
7. FEELING AFRAID AS IF SOMETHING AWFUL MIGHT HAPPEN: SEVERAL DAYS
4. TROUBLE RELAXING: MORE THAN HALF THE DAYS
6. BECOMING EASILY ANNOYED OR IRRITABLE: MORE THAN HALF THE DAYS
2. NOT BEING ABLE TO STOP OR CONTROL WORRYING: MORE THAN HALF THE DAYS
5. BEING SO RESTLESS THAT IT IS HARD TO SIT STILL: SEVERAL DAYS
6. BECOMING EASILY ANNOYED OR IRRITABLE: MORE THAN HALF THE DAYS
IF YOU CHECKED OFF ANY PROBLEMS ON THIS QUESTIONNAIRE, HOW DIFFICULT HAVE THESE PROBLEMS MADE IT FOR YOU TO DO YOUR WORK, TAKE CARE OF THINGS AT HOME, OR GET ALONG WITH OTHER PEOPLE: SOMEWHAT DIFFICULT
3. WORRYING TOO MUCH ABOUT DIFFERENT THINGS: NEARLY EVERY DAY

## 2025-03-10 ASSESSMENT — LIFESTYLE VARIABLES
ALCOHOL_DAYS_PER_WEEK: 0
HISTORY_ALCOHOL_USE: NO
HAVE YOU EVER RECEIVED ALCOHOL OR OTHER DRUG ABUSE TREATMENT: NO

## 2025-03-12 ASSESSMENT — PATIENT HEALTH QUESTIONNAIRE - PHQ9
4. FEELING TIRED OR HAVING LITTLE ENERGY: NEARLY EVERY DAY
SUM OF ALL RESPONSES TO PHQ QUESTIONS 1-9: 11
SUM OF ALL RESPONSES TO PHQ QUESTIONS 1-9: 11
2. FEELING DOWN, DEPRESSED OR HOPELESS: NOT AT ALL
SUM OF ALL RESPONSES TO PHQ QUESTIONS 1-9: 11
9. THOUGHTS THAT YOU WOULD BE BETTER OFF DEAD, OR OF HURTING YOURSELF: NOT AT ALL
SUM OF ALL RESPONSES TO PHQ QUESTIONS 1-9: 11
3. TROUBLE FALLING OR STAYING ASLEEP: MORE THAN HALF THE DAYS
6. FEELING BAD ABOUT YOURSELF - OR THAT YOU ARE A FAILURE OR HAVE LET YOURSELF OR YOUR FAMILY DOWN: MORE THAN HALF THE DAYS
2. FEELING DOWN, DEPRESSED OR HOPELESS: NOT AT ALL
8. MOVING OR SPEAKING SO SLOWLY THAT OTHER PEOPLE COULD HAVE NOTICED. OR THE OPPOSITE, BEING SO FIGETY OR RESTLESS THAT YOU HAVE BEEN MOVING AROUND A LOT MORE THAN USUAL: NOT AT ALL
6. FEELING BAD ABOUT YOURSELF - OR THAT YOU ARE A FAILURE OR HAVE LET YOURSELF OR YOUR FAMILY DOWN: MORE THAN HALF THE DAYS
8. MOVING OR SPEAKING SO SLOWLY THAT OTHER PEOPLE COULD HAVE NOTICED. OR THE OPPOSITE - BEING SO FIDGETY OR RESTLESS THAT YOU HAVE BEEN MOVING AROUND A LOT MORE THAN USUAL: NOT AT ALL
9. THOUGHTS THAT YOU WOULD BE BETTER OFF DEAD, OR OF HURTING YOURSELF: NOT AT ALL
5. POOR APPETITE OR OVEREATING: NOT AT ALL
SUM OF ALL RESPONSES TO PHQ QUESTIONS 1-9: 11
4. FEELING TIRED OR HAVING LITTLE ENERGY: NEARLY EVERY DAY
5. POOR APPETITE OR OVEREATING: NOT AT ALL
3. TROUBLE FALLING OR STAYING ASLEEP: MORE THAN HALF THE DAYS
1. LITTLE INTEREST OR PLEASURE IN DOING THINGS: SEVERAL DAYS
7. TROUBLE CONCENTRATING ON THINGS, SUCH AS READING THE NEWSPAPER OR WATCHING TELEVISION: NEARLY EVERY DAY
7. TROUBLE CONCENTRATING ON THINGS, SUCH AS READING THE NEWSPAPER OR WATCHING TELEVISION: NEARLY EVERY DAY
10. IF YOU CHECKED OFF ANY PROBLEMS, HOW DIFFICULT HAVE THESE PROBLEMS MADE IT FOR YOU TO DO YOUR WORK, TAKE CARE OF THINGS AT HOME, OR GET ALONG WITH OTHER PEOPLE: SOMEWHAT DIFFICULT
1. LITTLE INTEREST OR PLEASURE IN DOING THINGS: SEVERAL DAYS
10. IF YOU CHECKED OFF ANY PROBLEMS, HOW DIFFICULT HAVE THESE PROBLEMS MADE IT FOR YOU TO DO YOUR WORK, TAKE CARE OF THINGS AT HOME, OR GET ALONG WITH OTHER PEOPLE: SOMEWHAT DIFFICULT

## 2025-03-13 ENCOUNTER — OFFICE VISIT (OUTPATIENT)
Age: 50
End: 2025-03-13

## 2025-03-13 VITALS
DIASTOLIC BLOOD PRESSURE: 89 MMHG | TEMPERATURE: 98.2 F | SYSTOLIC BLOOD PRESSURE: 133 MMHG | HEART RATE: 65 BPM | RESPIRATION RATE: 16 BRPM | WEIGHT: 217 LBS | HEIGHT: 69 IN | OXYGEN SATURATION: 99 % | BODY MASS INDEX: 32.14 KG/M2

## 2025-03-13 DIAGNOSIS — F90.2 ATTENTION DEFICIT HYPERACTIVITY DISORDER (ADHD), COMBINED TYPE: Primary | Chronic | ICD-10-CM

## 2025-03-13 RX ORDER — ATOMOXETINE 18 MG/1
18 CAPSULE ORAL DAILY
Qty: 42 CAPSULE | Refills: 0 | Status: SHIPPED | OUTPATIENT
Start: 2025-03-13 | End: 2025-04-12

## 2025-03-13 NOTE — PROGRESS NOTES
Chief Complaint   Patient presents with    New Patient      Vitals:    03/13/25 0856   BP: 133/89   Pulse: 65   Resp: 16   Temp: 98.2 °F (36.8 °C)   SpO2: 99%      Prior to Admission medications    Medication Sig Start Date End Date Taking? Authorizing Provider   lisinopril-hydroCHLOROthiazide (PRINZIDE;ZESTORETIC) 20-25 MG per tablet Take 1 tablet by mouth daily 2/6/25  Yes Lorenzo Ferrer MD   pregabalin (LYRICA) 150 MG capsule Take 1 capsule by mouth 2 times daily for 180 days. Max Daily Amount: 300 mg 2/6/25 8/5/25 Yes Lorenzo Ferrer MD   albuterol sulfate HFA (PROVENTIL;VENTOLIN;PROAIR) 108 (90 Base) MCG/ACT inhaler Inhale 1 puff into the lungs as needed 10/15/24  Yes Libertad Quintero MD   ibuprofen (ADVIL;MOTRIN) 800 MG tablet Take 1 tablet by mouth daily 8/1/24  Yes Libertad Quintero MD   rOPINIRole (REQUIP) 0.25 MG tablet Take 1 tablet by mouth nightly 11/21/24  Yes Lorenzo Ferrer MD   valACYclovir (VALTREX) 1 g tablet Take 1 tablet twice a day for 7-10 day or until symptoms resolve. 11/21/24  Yes Lorenzo Ferrer MD   hydrOXYzine HCl (ATARAX) 25 MG tablet Take 1 tablet by mouth every 8 hours as needed for Itching 1/9/25   Lorenzo Ferrer MD   ALPRAZolam (XANAX) 0.25 MG tablet Take 1 tablet by mouth as needed. 9/13/24   ProviderLibertad MD      PHQ-9 Total Score: (Patient-Rptd) 11 (3/12/2025  3:33 PM)  Thoughts that you would be better off dead, or of hurting yourself in some way: (Patient-Rptd) 0 (3/12/2025  3:33 PM)         3/12/2025     3:33 PM 2/6/2025     2:09 PM 1/9/2025     3:18 PM   PHQ-9    Little interest or pleasure in doing things 1 0 0   Feeling down, depressed, or hopeless 0 0 0   Trouble falling or staying asleep, or sleeping too much 2     Feeling tired or having little energy 3     Poor appetite or overeating 0     Feeling bad about yourself - or that you are a failure or have let yourself or your family down 2     Trouble concentrating on things, such as reading the newspaper or watching 
Medications   Medication Sig Dispense Refill    atomoxetine (STRATTERA) 18 MG capsule Take 1 capsule by mouth daily Take 1 capsule by mouth once daily for two weeks then take 2 capsules once daily for two weeks 42 capsule 0    lisinopril-hydroCHLOROthiazide (PRINZIDE;ZESTORETIC) 20-25 MG per tablet Take 1 tablet by mouth daily 90 tablet 1    pregabalin (LYRICA) 150 MG capsule Take 1 capsule by mouth 2 times daily for 180 days. Max Daily Amount: 300 mg 60 capsule 5    albuterol sulfate HFA (PROVENTIL;VENTOLIN;PROAIR) 108 (90 Base) MCG/ACT inhaler Inhale 1 puff into the lungs as needed      ibuprofen (ADVIL;MOTRIN) 800 MG tablet Take 1 tablet by mouth daily      rOPINIRole (REQUIP) 0.25 MG tablet Take 1 tablet by mouth nightly 90 tablet 3    valACYclovir (VALTREX) 1 g tablet Take 1 tablet twice a day for 7-10 day or until symptoms resolve. 30 tablet 5    hydrOXYzine HCl (ATARAX) 25 MG tablet Take 1 tablet by mouth every 8 hours as needed for Itching 90 tablet 2     No current facility-administered medications for this visit.      Adjust psychiatric medications as needed based upon diagnoses and response to treatment.      2. Counseling and coordination of care including instructions for treatment, risks/benefits, risk factor reduction and patient/family education. She agrees with the plan. Patient instructed to call with any side effects, questions or issues.  3. PSYCHOTHERAPY: Patient was provided with supportive therapy, strongly encourage to seek psychotherapy.   4. MEDICAL CARE: Patient was strongly encourage to take their medical medications and follow up with their PCP on regular basis.    5. SUBSTANCE ABUSE CARE: Patient denies a smoking, drinking, abusing any illicit drugs.  6. Review previous labs and medical tests in the EHR. I will continue to order blood tests/labs and diagnostic tests as deemed appropriate and review results as they become available (see orders for details).  7. Review old psychiatric and

## 2025-03-17 DIAGNOSIS — F90.2 ATTENTION DEFICIT HYPERACTIVITY DISORDER (ADHD), COMBINED TYPE: Chronic | ICD-10-CM

## 2025-03-17 RX ORDER — ATOMOXETINE 18 MG/1
18 CAPSULE ORAL DAILY
Qty: 30 CAPSULE | Refills: 0 | Status: SHIPPED | OUTPATIENT
Start: 2025-03-17 | End: 2025-04-16

## 2025-03-27 DIAGNOSIS — F90.2 ATTENTION DEFICIT HYPERACTIVITY DISORDER (ADHD), COMBINED TYPE: Chronic | ICD-10-CM

## 2025-03-27 RX ORDER — ATOMOXETINE 18 MG/1
18 CAPSULE ORAL DAILY
Qty: 30 CAPSULE | Refills: 0 | OUTPATIENT
Start: 2025-03-27

## 2025-05-08 ENCOUNTER — TELEMEDICINE (OUTPATIENT)
Age: 50
End: 2025-05-08
Payer: MEDICAID

## 2025-05-08 DIAGNOSIS — E66.811 CLASS 1 OBESITY DUE TO EXCESS CALORIES WITH SERIOUS COMORBIDITY AND BODY MASS INDEX (BMI) OF 32.0 TO 32.9 IN ADULT: Primary | ICD-10-CM

## 2025-05-08 DIAGNOSIS — F90.9 ATTENTION DEFICIT HYPERACTIVITY DISORDER (ADHD), UNSPECIFIED ADHD TYPE: ICD-10-CM

## 2025-05-08 DIAGNOSIS — F43.10 POSTTRAUMATIC STRESS DISORDER: ICD-10-CM

## 2025-05-08 DIAGNOSIS — F60.3 BORDERLINE PERSONALITY DISORDER (HCC): ICD-10-CM

## 2025-05-08 DIAGNOSIS — E66.09 CLASS 1 OBESITY DUE TO EXCESS CALORIES WITH SERIOUS COMORBIDITY AND BODY MASS INDEX (BMI) OF 32.0 TO 32.9 IN ADULT: Primary | ICD-10-CM

## 2025-05-08 DIAGNOSIS — E78.1 HYPERTRIGLYCERIDEMIA: ICD-10-CM

## 2025-05-08 DIAGNOSIS — F41.9 ANXIETY: ICD-10-CM

## 2025-05-08 DIAGNOSIS — R73.03 PREDIABETES: ICD-10-CM

## 2025-05-08 DIAGNOSIS — I10 PRIMARY HYPERTENSION: ICD-10-CM

## 2025-05-08 DIAGNOSIS — G47.33 OSA (OBSTRUCTIVE SLEEP APNEA): ICD-10-CM

## 2025-05-08 DIAGNOSIS — F31.9 BIPOLAR 1 DISORDER (HCC): ICD-10-CM

## 2025-05-08 PROCEDURE — 99215 OFFICE O/P EST HI 40 MIN: CPT | Performed by: STUDENT IN AN ORGANIZED HEALTH CARE EDUCATION/TRAINING PROGRAM

## 2025-05-08 RX ORDER — PHENTERMINE HYDROCHLORIDE 37.5 MG/1
37.5 TABLET ORAL
Qty: 30 TABLET | Refills: 5 | Status: SHIPPED | OUTPATIENT
Start: 2025-05-08 | End: 2025-11-04

## 2025-05-08 ASSESSMENT — ENCOUNTER SYMPTOMS
VOMITING: 0
COUGH: 0
CONSTIPATION: 0
DIARRHEA: 0
SHORTNESS OF BREATH: 0
NAUSEA: 0
BLOOD IN STOOL: 0
ABDOMINAL PAIN: 0

## 2025-05-08 NOTE — ASSESSMENT & PLAN NOTE
Chronic, not at goal (unstable), patient in the process of finding new therapist/psychiatrist. Continue current regimen for now.

## 2025-05-08 NOTE — ASSESSMENT & PLAN NOTE
Chronic, at goal (stable), patient in the process of finding new therapist/psychiatrist. Continue current regimen for now.

## 2025-05-08 NOTE — ASSESSMENT & PLAN NOTE
Chronic, not at goal (unstable), Referral to sleep medicine for reevaluation as sleep apnea can also help with approval for GLP-1    Orders:    Kamilla Carrillo MD, Sleep Medicine, [place]

## 2025-05-08 NOTE — PROGRESS NOTES
Priya Villasenor, was evaluated through a synchronous (real-time) audio-video encounter. The patient (or guardian if applicable) is aware that this is a billable service, which includes applicable co-pays. This Virtual Visit was conducted with patient's (and/or legal guardian's) consent. Patient identification was verified, and a caregiver was present when appropriate.   The patient was located at Other: Car in [place]  Provider was located at Home (Appt Dept State): [place]  Confirm you are appropriately licensed, registered, or certified to deliver care in the state where the patient is located as indicated above. If you are not or unsure, please re-schedule the visit: Yes, I confirm.     Priya Villasenor (:  1975) is a Established patient, presenting virtually for evaluation of the following:      Below is the assessment and plan developed based on review of pertinent history, physical exam, labs, studies, and medications.     Assessment & Plan  Class 1 obesity due to excess calories with serious comorbidity and body mass index (BMI) of 32.0 to 32.9 in adult   Chronic, at goal (stable), See below  BMI today is 32.5  Height 5'9\". Weight 220 Lb  Patient has tried diet and lifestyle changes for >6 months without improvement in weight  She has history of gastric bypass surgery  Patient has tried medications that include: No  Patient's weight has now resulted in hypertension, high cholesterol, prediabetes and LUCILA which are serious co-morbidities  Patient would benefit from weight loss  Discussed and reviewed over continued diet and lifestyle changes  Per her insurance, she will require oral medication. We will start with Phentermine.  If inadequate weight loss, will switch to Saxenda per her insurance  If inadequate, then we can consider Wegovy or Zepbound  Follow-up in 3 months  Orders:    phentermine (ADIPEX-P) 37.5 MG tablet; Take 1 tablet by mouth every morning (before breakfast) for 180 days. Max Daily

## 2025-05-19 ENCOUNTER — OFFICE VISIT (OUTPATIENT)
Age: 50
End: 2025-05-19
Payer: MEDICAID

## 2025-05-19 VITALS
SYSTOLIC BLOOD PRESSURE: 138 MMHG | DIASTOLIC BLOOD PRESSURE: 84 MMHG | RESPIRATION RATE: 16 BRPM | TEMPERATURE: 97.6 F | BODY MASS INDEX: 32.23 KG/M2 | WEIGHT: 217.6 LBS | HEIGHT: 69 IN

## 2025-05-19 DIAGNOSIS — R20.2 PARESTHESIA: Primary | ICD-10-CM

## 2025-05-19 DIAGNOSIS — E55.9 VITAMIN D DEFICIENCY: ICD-10-CM

## 2025-05-19 DIAGNOSIS — R41.3 MEMORY DISTURBANCE: ICD-10-CM

## 2025-05-19 PROCEDURE — 99204 OFFICE O/P NEW MOD 45 MIN: CPT | Performed by: PSYCHIATRY & NEUROLOGY

## 2025-05-19 PROCEDURE — 3075F SYST BP GE 130 - 139MM HG: CPT | Performed by: PSYCHIATRY & NEUROLOGY

## 2025-05-19 PROCEDURE — 3079F DIAST BP 80-89 MM HG: CPT | Performed by: PSYCHIATRY & NEUROLOGY

## 2025-05-19 ASSESSMENT — PATIENT HEALTH QUESTIONNAIRE - PHQ9
SUM OF ALL RESPONSES TO PHQ QUESTIONS 1-9: 0
2. FEELING DOWN, DEPRESSED OR HOPELESS: NOT AT ALL
SUM OF ALL RESPONSES TO PHQ QUESTIONS 1-9: 0
SUM OF ALL RESPONSES TO PHQ QUESTIONS 1-9: 0
1. LITTLE INTEREST OR PLEASURE IN DOING THINGS: NOT AT ALL
SUM OF ALL RESPONSES TO PHQ QUESTIONS 1-9: 0

## 2025-05-19 NOTE — ASSESSMENT & PLAN NOTE
Unclear etiology.  I have low suspicion for multiple sclerosis but always possible.  She has had 2 MRI scans of the cervical spine with the most recent one being completed June 2024.  There is no evidence of demyelinating disease or other cord abnormality    Also clinically the presentation does not correlate well to MS but given the length of time this is going on I do think it is reasonable to check an MRI of the brain to make sure there is no specific pathology that is contributing to the symptoms whether it is demyelinating disease or other.    Patient is also pre- diabetic and also has fibromyalgia all of which could contribute to her symptoms    We are going to check some additional blood work as well as EMG  Pending results may may want to consider skin biopsy for small fiber disease but will wait until the results of the studies come in before making that determination

## 2025-05-19 NOTE — PATIENT INSTRUCTIONS
As per discussion  At this time it is unclear what is occurring.  So we need to take a better look to make sure there is no new pathology.  We can order EMGs to look for neuropathy or radiculopathy.  Working to do some blood work along with an EEG to make sure there is no changes in the brainwave pattern that could be contributing to your symptoms and of course we will check an MRI scan of the brain as we discussed in the office for the possibility of demyelinating disease.      And finally we can have you see our neuropsychologist for evaluation of your cognitive concerns.    Continue with your lists to help you remember what you are supposed to be doing and when you are supposed to be doing it and any other compensatory mechanisms that you have already sent in place    Below is some information about fibromyalgia in terms of some of the symptoms that it can cause especially as it relates to neuro      Common symptoms of fibromyalgia include pain and stiffness over the body, fatigue and tiredness, anxiety and depression, sleep problems, problems with thinking memory and concentration as well as headaches and migraines.    However, less recognized symptoms of fibromyalgia that are also noted include symptoms of dizziness, poor balance, and falls are common complaints. For some people, they're a minor annoyance that crops up on occasion. In others, they can be severely debilitating and lead to regular injuries. Falling, and especially falling frequently, is a serious problem.    Fibromyalgia symptoms:  Pain and Stiffness:  Widespread pain:  Aches and pains felt all over the body, often described as burning, throbbing, or aching.   Morning stiffness:  Stiffness, particularly in the morning, that may last for an extended period.   Tenderness:  Increased sensitivity to touch in specific areas, often around the neck, shoulders, hips, and knees.   Fatigue and Sleep Disturbances:  Extreme fatigue: Persistent and

## 2025-05-19 NOTE — PROGRESS NOTES
RBC 5.58 (H) 11/21/2024    MCH 25.3 (L) 11/21/2024    MCHC 31.8 11/21/2024    RDW 15.4 11/21/2024          Lab Results   Component Value Date     11/21/2024    K 3.7 11/21/2024     11/21/2024    CO2 23 11/21/2024    BUN 11 11/21/2024    CREATININE 0.73 11/21/2024    GLUCOSE 102 (H) 11/21/2024    CALCIUM 9.4 11/21/2024    BILITOT 0.2 11/21/2024    ALKPHOS 91 11/21/2024    AST 20 11/21/2024    ALT 14 11/21/2024    LABGLOM >60 03/29/2023    AGRATIO 0.9 (L) 03/29/2023    GLOB 4.0 03/29/2023        No results found for: \"VITD25\"      Lab Results   Component Value Date    TSH 0.292 (L) 11/21/2024       No results found for: \"JIJZBVPT96\"          Current Outpatient Medications   Medication Sig Dispense Refill    phentermine (ADIPEX-P) 37.5 MG tablet Take 1 tablet by mouth every morning (before breakfast) for 180 days. Max Daily Amount: 37.5 mg 30 tablet 5    lisinopril-hydroCHLOROthiazide (PRINZIDE;ZESTORETIC) 20-25 MG per tablet Take 1 tablet by mouth daily 90 tablet 1    pregabalin (LYRICA) 150 MG capsule Take 1 capsule by mouth 2 times daily for 180 days. Max Daily Amount: 300 mg 60 capsule 5    albuterol sulfate HFA (PROVENTIL;VENTOLIN;PROAIR) 108 (90 Base) MCG/ACT inhaler Inhale 1 puff into the lungs as needed      ibuprofen (ADVIL;MOTRIN) 800 MG tablet Take 1 tablet by mouth daily      rOPINIRole (REQUIP) 0.25 MG tablet Take 1 tablet by mouth nightly 90 tablet 3    valACYclovir (VALTREX) 1 g tablet Take 1 tablet twice a day for 7-10 day or until symptoms resolve. 30 tablet 5     No current facility-administered medications for this visit.        Past medical history/surgical history, family history, and social history have been reviewed for today's visit      ROS    A ten system review of constitutional, cardiovascular, respiratory, musculoskeletal, endocrine, skin, SHEENT, genitourinary, psychiatric and neurologic systems was obtained and is unremarkable except as mentioned under
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
## 2025-05-22 ENCOUNTER — E-VISIT (OUTPATIENT)
Age: 50
End: 2025-05-22

## 2025-05-22 DIAGNOSIS — L23.7 POISON IVY DERMATITIS: Primary | ICD-10-CM

## 2025-05-22 RX ORDER — PREDNISONE 10 MG/1
TABLET ORAL
Qty: 38 TABLET | Refills: 0 | Status: SHIPPED | OUTPATIENT
Start: 2025-05-22

## 2025-05-22 RX ORDER — PREDNISONE 10 MG/1
TABLET ORAL
Qty: 40 TABLET | Refills: 0 | Status: SHIPPED | OUTPATIENT
Start: 2025-05-22 | End: 2025-05-22

## 2025-05-22 NOTE — PROGRESS NOTES
Priya Jacklyn (1975) initiated an asynchronous digital communication through [employer].    HPI: per patient questionnaire     Exam: not applicable    Diagnoses and all orders for this visit:  Diagnoses and all orders for this visit:    Poison ivy dermatitis  -     Discontinue: predniSONE (DELTASONE) 10 MG tablet; Take 5 tablets daily for 5 days followed by 3 tablets daily for 3 days, 2 tablets daily for 3 days, 1 tablet daily for 3 days and then half a tablet daily for 4 days for 16 day course.  -     predniSONE (DELTASONE) 10 MG tablet; Take 6 tablets daily for 3 days followed by 3 tablets daily for 3 days, 2 tablets daily for 3 days, 1 tablet daily for 3 days and then half a tablet daily for 4 days for 16 day course.          10 minutes were spent on the digital evaluation and management of this patient.    Lorenzo Ferrer MD

## 2025-06-02 ENCOUNTER — E-VISIT (OUTPATIENT)
Age: 50
End: 2025-06-02
Payer: MEDICAID

## 2025-06-02 ENCOUNTER — TELEPHONE (OUTPATIENT)
Age: 50
End: 2025-06-02

## 2025-06-02 DIAGNOSIS — L23.7 POISON IVY DERMATITIS: Primary | ICD-10-CM

## 2025-06-02 PROCEDURE — 99422 OL DIG E/M SVC 11-20 MIN: CPT | Performed by: STUDENT IN AN ORGANIZED HEALTH CARE EDUCATION/TRAINING PROGRAM

## 2025-06-02 RX ORDER — PREDNISONE 10 MG/1
TABLET ORAL
Qty: 40 TABLET | Refills: 0 | Status: SHIPPED | OUTPATIENT
Start: 2025-06-02

## 2025-06-02 RX ORDER — TRIAMCINOLONE ACETONIDE 1 MG/G
OINTMENT TOPICAL 2 TIMES DAILY
Qty: 80 G | Refills: 1 | Status: SHIPPED | OUTPATIENT
Start: 2025-06-02

## 2025-06-02 NOTE — PROGRESS NOTES
Priya Jacklyn (1975) initiated an asynchronous digital communication through [employer].    HPI: per patient questionnaire     Exam: not applicable    Diagnoses and all orders for this visit:  Diagnoses and all orders for this visit:    Poison ivy dermatitis  -     predniSONE (DELTASONE) 10 MG tablet; Take 4 tabs by mouth once daily x 4 days, then 3 tabs once daily x 4 days, 2 tabs once daily x 4 days, 1 tab daily x 4 days  -     triamcinolone (KENALOG) 0.1 % ointment; Apply topically in the morning and at bedtime          15 minutes were spent on the digital evaluation and management of this patient.    Lorenzo Ferrer MD

## 2025-06-02 NOTE — TELEPHONE ENCOUNTER
Priya is calling to schedule an appt with Dr Khan, from a referral was referred by  05-19. Please call her back at [phone] to get an appt scheduled.

## 2025-06-03 ENCOUNTER — HOSPITAL ENCOUNTER (OUTPATIENT)
Facility: HOSPITAL | Age: 50
Discharge: HOME | End: 2025-06-06

## 2025-06-03 ENCOUNTER — PROCEDURE VISIT (OUTPATIENT)
Age: 50
End: 2025-06-03
Payer: MEDICAID

## 2025-06-03 DIAGNOSIS — M79.2 NEURALGIA: ICD-10-CM

## 2025-06-03 DIAGNOSIS — M79.10 MYALGIA: ICD-10-CM

## 2025-06-03 DIAGNOSIS — R20.0 NUMBNESS AND TINGLING OF LEFT ARM AND LEG: Primary | ICD-10-CM

## 2025-06-03 DIAGNOSIS — M62.81 MUSCLE WEAKNESS: ICD-10-CM

## 2025-06-03 DIAGNOSIS — R20.2 PARESTHESIA: ICD-10-CM

## 2025-06-03 DIAGNOSIS — R20.2 NUMBNESS AND TINGLING OF LEFT ARM AND LEG: Primary | ICD-10-CM

## 2025-06-03 DIAGNOSIS — E55.9 VITAMIN D DEFICIENCY: ICD-10-CM

## 2025-06-03 PROCEDURE — 95912 NRV CNDJ TEST 11-12 STUDIES: CPT | Performed by: PSYCHIATRY & NEUROLOGY

## 2025-06-03 PROCEDURE — 95886 MUSC TEST DONE W/N TEST COMP: CPT | Performed by: PSYCHIATRY & NEUROLOGY

## 2025-06-03 NOTE — PROGRESS NOTES
ELECTRODIANOSTIC REPORT  Test Date:  6/3/2025    Patient: Priya Villasenor : 1975 Physician: Dr. Tuttle   Sex: Female Tech: KATERINA DislaNCT  Ref Phys: Anitha Hurst NP     CHIEF COMPLAINTS:  Patient is a 49 year-old female who presents with intermittent bouts of sensory disturbance and pain in her left upper and lower extremity.  Her examination does reveal 5 out of 5 strength.  Normal gait    EMG & NCV Findings:    Nerve conduction studies as listed below in the left upper and lower were within reference of normal.    Disposable concentric needle examination of the muscles listed below in the left upper and lower extremities were normal.        Interpretation:    This study was normal.  There was no electrodiagnostic evidence upon today’s examination suggesting a focal or generalized large fiber neuropathy, myopathy, or cervical/lumbar radiculopathy.     __________________  Mars Tuttle D.O. FAAN          Nerve Conduction Studies  Anti Sensory Summary Table     Stim Site NR Peak (ms) Norm Peak (ms) O-P Amp (µV) Norm O-P Amp Site1 Site2 Dist (cm)   Left Median Anti Sensory (2nd Digit)  33.6 °C   Wrist    3.9 <4 15.5 >11 Wrist 2nd Digit 14.0   Left Radial Anti Sensory (Base 1st Digit)  33.6 °C   Wrist    2.2 <2.9 28.1 >15 Wrist Base 1st Digit 10.0   Left Sup Fibular Anti Sensory (Lat ankle)  33.6 °C   Lower leg    2.8 <4.6 6.8 >4 Lower leg Lat ankle 10.0   Left Sural Anti Sensory (Lat Mall)  33.6 °C   Calf    3.1 <4.4 8.2 >6 Calf Lat Mall 14.0   Left Ulnar Anti Sensory (5th Digit)  33.6 °C   Wrist    3.2 <4.0 15.1 >10 Wrist 5th Digit 14.0     Motor Summary Table     Stim Site NR Onset (ms) Norm Onset (ms) O-P* Amp (mV) Norm O-P Amp P-T Amp (mV) Site1 Site2 Dist (cm) Stevenson (m/s)   Left Fibular Motor (Ext Dig Brev)  33.6 °C   Ankle    5.9 <6.5 1.5 >1.1  Ankle Ext Dig Brev 8.0    B Fib    15.2  1.5   B Fib Ankle 39.0 42   Poplt    17.0  1.4   Poplt B Fib 10.0 56   Left Median Motor (Abd Poll Brev)  33.6

## 2025-06-04 LAB
25(OH)D3 SERPL-MCNC: 31.9 NG/ML (ref 30–100)
VIT B12 SERPL-MCNC: 490 PG/ML (ref 193–986)

## 2025-06-06 ENCOUNTER — CLINICAL DOCUMENTATION (OUTPATIENT)
Age: 50
End: 2025-06-06

## 2025-06-06 ENCOUNTER — RESULTS FOLLOW-UP (OUTPATIENT)
Age: 50
End: 2025-06-06

## 2025-06-06 DIAGNOSIS — E23.7 PITUITARY LESION: ICD-10-CM

## 2025-06-06 DIAGNOSIS — D32.9 MENINGIOMA (HCC): Primary | ICD-10-CM

## 2025-06-06 NOTE — PROGRESS NOTES
Received message from lab stating   \"Tests Affected:  Vitamin B1 from 6/3  Description of Problem:  Lab collection error, requires Whole Blood, plasma sent  Please place a new order and Client Services will contact the patient for recollection\"    Provider states:   \"Not necessary I will consider reordering it at next office visit if appropriate\"

## 2025-06-07 LAB — VIT B6 SERPL-MCNC: 17.6 UG/L (ref 3.4–65.2)

## 2025-06-16 ENCOUNTER — TELEMEDICINE (OUTPATIENT)
Age: 50
End: 2025-06-16

## 2025-06-16 DIAGNOSIS — R41.844 EXECUTIVE FUNCTION DEFICIT: ICD-10-CM

## 2025-06-16 DIAGNOSIS — F41.9 ANXIETY: ICD-10-CM

## 2025-06-16 DIAGNOSIS — R45.89 SYMPTOMS OF DEPRESSION: ICD-10-CM

## 2025-06-16 DIAGNOSIS — R41.3 MEMORY LOSS: Primary | ICD-10-CM

## 2025-06-16 DIAGNOSIS — Z91.49 HISTORY OF PSYCHOLOGICAL TRAUMA: ICD-10-CM

## 2025-06-16 DIAGNOSIS — R47.89 WORD FINDING DIFFICULTY: ICD-10-CM

## 2025-06-16 DIAGNOSIS — R41.9 DEFICIT IN COMPREHENSION: ICD-10-CM

## 2025-06-16 DIAGNOSIS — Z86.59 HISTORY OF PANIC ATTACKS: ICD-10-CM

## 2025-06-16 DIAGNOSIS — Z62.9 HISTORY OF ADVERSE CHILDHOOD EXPERIENCES: ICD-10-CM

## 2025-06-16 SDOH — HEALTH STABILITY - MENTAL HEALTH: PROBLEM RELATED TO UPBRINGING, UNSPECIFIED: Z62.9

## 2025-06-16 NOTE — PROGRESS NOTES
Prozac, Wellbutrin, Vraylar, and gabapentin.  Currently, Ms. Villasenor is not prescribed any medications for mood or anxiety.  She is looking for a new psychiatrist and a new therapist.  She has not done any trauma informed therapy in the past but did have some brief DBT interventions.  History is significant for a psychiatric hospitalization in 2015 related to suicidal ideation with plan and intent.  Ms. Villasenor denied a history of suicide attempt, self-harm behaviors, and psychosis.  Family history is significant for bipolar disorder and PTSD in the mother; anxiety in a sister; and, depression, bipolar, and anxiety in another sister.    Socially, Ms. Villasenor was born in [place] but lived in a number of geographic regions.  She was raised primarily by her maternal grandparents.  She did not meet her father until she was 17 years old.  He was incarcerated most of her life and is now .  Her mother is also .  Ms. Villasenor has a number of maternal and paternal siblings.  She has a distant relationship with son and an amicable relationship with others.  Ms. Villasenor has been  and  twice.  She has 3 adult children with whom she maintains a close relationship.  Ms. Villasenor lives with her 2 granddaughters (she shares physical and legal custody of them with her daughter), her daughter, and her significant other.  Social support includes a group of friends.  There is no current exercise regimen but Ms. Villasenor stays active feeding her chickens, ducks, and rabbits.  Hobbies include gardening and spending time with her grandchildren.    Academically, Ms. Villasenor believes she has required repeated grades but does not know which grade this was.  She did not require any special education services.  Due to pregnancy, she completed her high school diploma through homebound instruction.  After high school, she took several classes at [employer] but did not complete her degree.  Vocationally, Ms.

## 2025-06-20 DIAGNOSIS — E66.811 CLASS 1 OBESITY DUE TO EXCESS CALORIES WITH SERIOUS COMORBIDITY AND BODY MASS INDEX (BMI) OF 32.0 TO 32.9 IN ADULT: ICD-10-CM

## 2025-06-20 DIAGNOSIS — E66.09 CLASS 1 OBESITY DUE TO EXCESS CALORIES WITH SERIOUS COMORBIDITY AND BODY MASS INDEX (BMI) OF 32.0 TO 32.9 IN ADULT: ICD-10-CM

## 2025-06-20 DIAGNOSIS — G25.81 RESTLESS LEG SYNDROME: ICD-10-CM

## 2025-06-20 DIAGNOSIS — M79.7 FIBROMYALGIA: ICD-10-CM

## 2025-06-20 DIAGNOSIS — B00.2 RECURRENT ORAL HERPES SIMPLEX: ICD-10-CM

## 2025-06-20 DIAGNOSIS — I10 PRIMARY HYPERTENSION: ICD-10-CM

## 2025-06-23 DIAGNOSIS — R20.2 NUMBNESS AND TINGLING OF LEFT ARM AND LEG: Primary | ICD-10-CM

## 2025-06-23 DIAGNOSIS — R20.2 PARESTHESIA: ICD-10-CM

## 2025-06-23 DIAGNOSIS — R20.0 NUMBNESS AND TINGLING OF LEFT ARM AND LEG: Primary | ICD-10-CM

## 2025-06-23 NOTE — TELEPHONE ENCOUNTER
Pt requested refill(s) via [employer]     Duplicate request:     For Pharmacy Admin Tracking Only    Program: Medication Refill  Intervention Detail: Discontinued Rx: 5, reason: Duplicate Therapy  Time Spent (min): 5    Requested Prescriptions     Pending Prescriptions Disp Refills    rOPINIRole (REQUIP) 0.25 MG tablet 90 tablet 3     Sig: Take 1 tablet by mouth nightly    valACYclovir (VALTREX) 1 g tablet 30 tablet 5     Sig: Take 1 tablet twice a day for 7-10 day or until symptoms resolve.    lisinopril-hydroCHLOROthiazide (PRINZIDE;ZESTORETIC) 20-25 MG per tablet 90 tablet 1     Sig: Take 1 tablet by mouth daily    pregabalin (LYRICA) 150 MG capsule 60 capsule 5     Sig: Take 1 capsule by mouth 2 times daily for 180 days. Max Daily Amount: 300 mg    phentermine (ADIPEX-P) 37.5 MG tablet 30 tablet 5     Sig: Take 1 tablet by mouth every morning (before breakfast) for 180 days. Max Daily Amount: 37.5 mg

## 2025-06-25 ENCOUNTER — APPOINTMENT (OUTPATIENT)
Dept: CT IMAGING | Facility: HOSPITAL | Age: 50
End: 2025-06-25
Payer: COMMERCIAL

## 2025-06-25 ENCOUNTER — HOSPITAL ENCOUNTER (EMERGENCY)
Facility: HOSPITAL | Age: 50
Discharge: HOME | End: 2025-06-25
Attending: EMERGENCY MEDICINE | Admitting: EMERGENCY MEDICINE
Payer: COMMERCIAL

## 2025-06-25 VITALS
SYSTOLIC BLOOD PRESSURE: 117 MMHG | OXYGEN SATURATION: 100 % | HEIGHT: 66 IN | TEMPERATURE: 98.1 F | RESPIRATION RATE: 14 BRPM | DIASTOLIC BLOOD PRESSURE: 82 MMHG | HEART RATE: 74 BPM | WEIGHT: 170 LBS | BODY MASS INDEX: 27.32 KG/M2

## 2025-06-25 DIAGNOSIS — R55 SYNCOPE, UNSPECIFIED SYNCOPE TYPE: Primary | ICD-10-CM

## 2025-06-25 LAB
ALBUMIN SERPL-MCNC: 3.9 G/DL (ref 3.5–5.2)
ALBUMIN/GLOB SERPL: 1.6 G/DL
ALP SERPL-CCNC: 57 U/L (ref 39–117)
ALT SERPL W P-5'-P-CCNC: 14 U/L (ref 1–33)
ANION GAP SERPL CALCULATED.3IONS-SCNC: 11 MMOL/L (ref 5–15)
AST SERPL-CCNC: 12 U/L (ref 1–32)
BASOPHILS # BLD AUTO: 0.04 10*3/MM3 (ref 0–0.2)
BASOPHILS NFR BLD AUTO: 0.4 % (ref 0–1.5)
BILIRUB SERPL-MCNC: 0.3 MG/DL (ref 0–1.2)
BUN SERPL-MCNC: 14.4 MG/DL (ref 6–20)
BUN/CREAT SERPL: 17.1 (ref 7–25)
CALCIUM SPEC-SCNC: 8.8 MG/DL (ref 8.6–10.5)
CHLORIDE SERPL-SCNC: 109 MMOL/L (ref 98–107)
CO2 SERPL-SCNC: 19 MMOL/L (ref 22–29)
CREAT SERPL-MCNC: 0.84 MG/DL (ref 0.57–1)
DEPRECATED RDW RBC AUTO: 39.9 FL (ref 37–54)
EGFRCR SERPLBLD CKD-EPI 2021: 86.4 ML/MIN/1.73
EOSINOPHIL # BLD AUTO: 0.06 10*3/MM3 (ref 0–0.4)
EOSINOPHIL NFR BLD AUTO: 0.5 % (ref 0.3–6.2)
ERYTHROCYTE [DISTWIDTH] IN BLOOD BY AUTOMATED COUNT: 12 % (ref 12.3–15.4)
GEN 5 1HR TROPONIN T REFLEX: <6 NG/L
GLOBULIN UR ELPH-MCNC: 2.5 GM/DL
GLUCOSE BLDC GLUCOMTR-MCNC: 141 MG/DL (ref 70–130)
GLUCOSE SERPL-MCNC: 100 MG/DL (ref 65–99)
HCT VFR BLD AUTO: 43.5 % (ref 34–46.6)
HGB BLD-MCNC: 14.2 G/DL (ref 12–15.9)
IMM GRANULOCYTES # BLD AUTO: 0.05 10*3/MM3 (ref 0–0.05)
IMM GRANULOCYTES NFR BLD AUTO: 0.5 % (ref 0–0.5)
LYMPHOCYTES # BLD AUTO: 1.9 10*3/MM3 (ref 0.7–3.1)
LYMPHOCYTES NFR BLD AUTO: 17.3 % (ref 19.6–45.3)
MCH RBC QN AUTO: 29.6 PG (ref 26.6–33)
MCHC RBC AUTO-ENTMCNC: 32.6 G/DL (ref 31.5–35.7)
MCV RBC AUTO: 90.8 FL (ref 79–97)
MONOCYTES # BLD AUTO: 1 10*3/MM3 (ref 0.1–0.9)
MONOCYTES NFR BLD AUTO: 9.1 % (ref 5–12)
NEUTROPHILS NFR BLD AUTO: 7.92 10*3/MM3 (ref 1.7–7)
NEUTROPHILS NFR BLD AUTO: 72.2 % (ref 42.7–76)
NRBC BLD AUTO-RTO: 0 /100 WBC (ref 0–0.2)
PLATELET # BLD AUTO: 262 10*3/MM3 (ref 140–450)
PMV BLD AUTO: 8.6 FL (ref 6–12)
POTASSIUM SERPL-SCNC: 3.6 MMOL/L (ref 3.5–5.2)
PROT SERPL-MCNC: 6.4 G/DL (ref 6–8.5)
RBC # BLD AUTO: 4.79 10*6/MM3 (ref 3.77–5.28)
SODIUM SERPL-SCNC: 139 MMOL/L (ref 136–145)
TROPONIN T NUMERIC DELTA: NORMAL
TROPONIN T SERPL HS-MCNC: <6 NG/L
WBC NRBC COR # BLD AUTO: 10.97 10*3/MM3 (ref 3.4–10.8)

## 2025-06-25 PROCEDURE — 25810000003 SODIUM CHLORIDE 0.9 % SOLUTION: Performed by: NURSE PRACTITIONER

## 2025-06-25 PROCEDURE — 93010 ELECTROCARDIOGRAM REPORT: CPT | Performed by: INTERNAL MEDICINE

## 2025-06-25 PROCEDURE — 82948 REAGENT STRIP/BLOOD GLUCOSE: CPT

## 2025-06-25 PROCEDURE — 96360 HYDRATION IV INFUSION INIT: CPT

## 2025-06-25 PROCEDURE — 80053 COMPREHEN METABOLIC PANEL: CPT | Performed by: NURSE PRACTITIONER

## 2025-06-25 PROCEDURE — 70450 CT HEAD/BRAIN W/O DYE: CPT

## 2025-06-25 PROCEDURE — 93005 ELECTROCARDIOGRAM TRACING: CPT | Performed by: NURSE PRACTITIONER

## 2025-06-25 PROCEDURE — 84484 ASSAY OF TROPONIN QUANT: CPT | Performed by: NURSE PRACTITIONER

## 2025-06-25 PROCEDURE — 70450 CT HEAD/BRAIN W/O DYE: CPT | Performed by: RADIOLOGY

## 2025-06-25 PROCEDURE — 36415 COLL VENOUS BLD VENIPUNCTURE: CPT

## 2025-06-25 PROCEDURE — 85025 COMPLETE CBC W/AUTO DIFF WBC: CPT | Performed by: NURSE PRACTITIONER

## 2025-06-25 PROCEDURE — 99284 EMERGENCY DEPT VISIT MOD MDM: CPT

## 2025-06-25 RX ORDER — SODIUM CHLORIDE 0.9 % (FLUSH) 0.9 %
10 SYRINGE (ML) INJECTION AS NEEDED
Status: DISCONTINUED | OUTPATIENT
Start: 2025-06-25 | End: 2025-06-25 | Stop reason: HOSPADM

## 2025-06-25 RX ADMIN — SODIUM CHLORIDE 1000 ML: 9 INJECTION, SOLUTION INTRAVENOUS at 15:49

## 2025-06-25 NOTE — ED PROVIDER NOTES
Subjective   History of Present Illness  Patient is a 49-year-old female presents today for syncope.  Patient reports that she passed out while she was getting her blood drawn.  Family reports the patient has been out of it since.  Patient is alert and responsive and talks to me coherently.  Patient reports she just feels very tired.  Patient denies any chest pain.  Patient denies shortness of breath.      Syncope      Review of Systems   Constitutional: Negative.    HENT: Negative.     Eyes: Negative.    Respiratory: Negative.     Cardiovascular:  Positive for syncope.   Gastrointestinal: Negative.    Endocrine: Negative.    Genitourinary: Negative.    Musculoskeletal: Negative.    Skin: Negative.    Allergic/Immunologic: Negative.    Hematological: Negative.    Psychiatric/Behavioral: Negative.         Past Medical History:   Diagnosis Date    Arthritis     Asthma     Diabetes mellitus     Elevated cholesterol     GERD (gastroesophageal reflux disease)     Hypertension     Kidney stones     BUCK (obstructive sleep apnea) 2021    Ovarian cancer     PONV (postoperative nausea and vomiting)        Allergies   Allergen Reactions    Bee Venom Anaphylaxis    Shellfish-Derived Products Shortness Of Breath and Itching    Other Other (See Comments)     PT REPORTS ALLERGY TEST SHOW POSITIVE ALLERGY-STATES THAT MEAT PRODUCTS GETS STUCK IN THROAT BUT NO REACTION TO MEAT PRODUCTS-STATES SHE STILL EATS THEM    Watermelon Flavoring Agent (Non-Screening) Rash       Past Surgical History:   Procedure Laterality Date    APPENDECTOMY       SECTION      X 4    CHOLECYSTECTOMY      COLONOSCOPY      CYST REMOVAL Left     NECK    ENDOSCOPY N/A 2020    Procedure: ESOPHAGOGASTRODUODENOSCOPY WITH DILATATION CPT CODE: 59950;  Surgeon: Jose Roberto Howard MD;  Location: [facility];  Service: Gastroenterology;  Laterality: N/A;    ENDOSCOPY N/A 2022    Procedure: ESOPHAGOGASTRODUODENOSCOPY WITH BIOPSY AND  DILATATION;  Surgeon: Jose Roberto Howard MD;  Location: [facility] OR;  Service: Gastroenterology;  Laterality: N/A;  DILATED ESOPHAGUS TO 20 MM    HEMORRHOIDECTOMY      OOPHORECTOMY      UNKNOWN PER PT    URETEROSCOPY LASER LITHOTRIPSY WITH STENT INSERTION Right 7/12/2024    Procedure: URETEROSCOPY LASER LITHOTRIPSY;  Surgeon: Petr Pedraza MD;  Location: [facility] OR;  Service: Urology;  Laterality: Right;       Family History   Problem Relation Age of Onset    Diabetes Mother     Hyperlipidemia Mother     Cancer Maternal Grandmother         breast    Lung cancer Maternal Grandmother     Cancer Paternal Grandmother         breast    Breast cancer Paternal Grandmother        Social History     Socioeconomic History    Marital status:    Tobacco Use    Smoking status: Never     Passive exposure: Never    Smokeless tobacco: Never   Vaping Use    Vaping status: Former    Substances: Flavoring   Substance and Sexual Activity    Alcohol use: Never    Drug use: Never    Sexual activity: Defer           Objective   Physical Exam  Vitals and nursing note reviewed.   Constitutional:       Appearance: She is well-developed.   HENT:      Head: Normocephalic.      Right Ear: External ear normal.      Left Ear: External ear normal.   Eyes:      Conjunctiva/sclera: Conjunctivae normal.      Pupils: Pupils are equal, round, and reactive to light.   Cardiovascular:      Rate and Rhythm: Normal rate and regular rhythm.      Heart sounds: Normal heart sounds.   Pulmonary:      Effort: Pulmonary effort is normal.      Breath sounds: Normal breath sounds.   Abdominal:      General: Bowel sounds are normal.      Palpations: Abdomen is soft.   Musculoskeletal:         General: Normal range of motion.      Cervical back: Normal range of motion and neck supple.   Skin:     General: Skin is warm and dry.      Capillary Refill: Capillary refill takes less than 2 seconds.   Neurological:      Mental Status: She is alert  and oriented to person, place, and time.   Psychiatric:         Behavior: Behavior normal.         Thought Content: Thought content normal.         Procedures           ED Course  ED Course as of 06/25/25 1906   Wed Jun 25, 2025   1520 ECG 12 Lead Chest Pain  Vent. Rate :  87 BPM     Atrial Rate :  87 BPM     P-R Int : 150 ms          QRS Dur :  84 ms      QT Int : 370 ms       P-R-T Axes :  38   3   4 degrees    QTcB Int : 445 ms     Normal sinus rhythm  Normal ECG  When compared with ECG of 08-Sep-2021 09:14,  No significant change was found   [ES]      ED Course User Index  [ES] John Morrissey MD                                           Results for orders placed or performed during the hospital encounter of 06/25/25   POC Glucose Once    Collection Time: 06/25/25  2:19 PM    Specimen: Blood   Result Value Ref Range    Glucose 141 (H) 70 - 130 mg/dL   ECG 12 Lead Chest Pain    Collection Time: 06/25/25  2:38 PM   Result Value Ref Range    QT Interval 370 ms    QTC Interval 445 ms   Comprehensive Metabolic Panel    Collection Time: 06/25/25  3:19 PM    Specimen: Arm, Left; Blood   Result Value Ref Range    Glucose 100 (H) 65 - 99 mg/dL    BUN 14.4 6.0 - 20.0 mg/dL    Creatinine 0.84 0.57 - 1.00 mg/dL    Sodium 139 136 - 145 mmol/L    Potassium 3.6 3.5 - 5.2 mmol/L    Chloride 109 (H) 98 - 107 mmol/L    CO2 19.0 (L) 22.0 - 29.0 mmol/L    Calcium 8.8 8.6 - 10.5 mg/dL    Total Protein 6.4 6.0 - 8.5 g/dL    Albumin 3.9 3.5 - 5.2 g/dL    ALT (SGPT) 14 1 - 33 U/L    AST (SGOT) 12 1 - 32 U/L    Alkaline Phosphatase 57 39 - 117 U/L    Total Bilirubin 0.3 0.0 - 1.2 mg/dL    Globulin 2.5 gm/dL    A/G Ratio 1.6 g/dL    BUN/Creatinine Ratio 17.1 7.0 - 25.0    Anion Gap 11.0 5.0 - 15.0 mmol/L    eGFR 86.4 >60.0 mL/min/1.73   High Sensitivity Troponin T    Collection Time: 06/25/25  3:19 PM    Specimen: Arm, Left; Blood   Result Value Ref Range    HS Troponin T <6 <14 ng/L   CBC Auto Differential    Collection Time:  06/25/25  3:19 PM    Specimen: Arm, Left; Blood   Result Value Ref Range    WBC 10.97 (H) 3.40 - 10.80 10*3/mm3    RBC 4.79 3.77 - 5.28 10*6/mm3    Hemoglobin 14.2 12.0 - 15.9 g/dL    Hematocrit 43.5 34.0 - 46.6 %    MCV 90.8 79.0 - 97.0 fL    MCH 29.6 26.6 - 33.0 pg    MCHC 32.6 31.5 - 35.7 g/dL    RDW 12.0 (L) 12.3 - 15.4 %    RDW-SD 39.9 37.0 - 54.0 fl    MPV 8.6 6.0 - 12.0 fL    Platelets 262 140 - 450 10*3/mm3    Neutrophil % 72.2 42.7 - 76.0 %    Lymphocyte % 17.3 (L) 19.6 - 45.3 %    Monocyte % 9.1 5.0 - 12.0 %    Eosinophil % 0.5 0.3 - 6.2 %    Basophil % 0.4 0.0 - 1.5 %    Immature Grans % 0.5 0.0 - 0.5 %    Neutrophils, Absolute 7.92 (H) 1.70 - 7.00 10*3/mm3    Lymphocytes, Absolute 1.90 0.70 - 3.10 10*3/mm3    Monocytes, Absolute 1.00 (H) 0.10 - 0.90 10*3/mm3    Eosinophils, Absolute 0.06 0.00 - 0.40 10*3/mm3    Basophils, Absolute 0.04 0.00 - 0.20 10*3/mm3    Immature Grans, Absolute 0.05 0.00 - 0.05 10*3/mm3    nRBC 0.0 0.0 - 0.2 /100 WBC   High Sensitivity Troponin T 1Hr    Collection Time: 06/25/25  4:20 PM    Specimen: Arm, Left; Blood   Result Value Ref Range    HS Troponin T <6 <14 ng/L    Troponin T Numeric Delta       CT Head Without Contrast   Final Result     Unremarkable exam. No CT evidence of acute intracranial abnormality.       This report was finalized on 6/25/2025 4:32 PM by Dr. Lex Kyle MD.                        Medical Decision Making  MDM:    Escalation of care including admission/observation considered    - Discussions of management with other providers:  None    - Discussed/reviewed with Radiology regarding test interpretation    - Independent interpretation: None    - Additional patient history obtained from: None    - Review of external non-ED record (if available):  Prior Inpt record, Office record, Outpt record, Prior Outpt labs, PCP record, Outside ED record, Other    - Chronic conditions affecting care: See HPI and medical Hx.    - Social Determinants of health  significantly affecting care:  None        Medical Decision Making Discussion:    Patient is a 49-year-old female presents today for syncope.  Patient reports that she passed out while she was getting her blood drawn.  Family reports the patient has been out of it since.  Patient is alert and responsive and talks to me coherently.  Patient reports she just feels very tired.  Patient denies any chest pain.  Patient denies shortness of breath.      The patient has been given very strict return precautions to return to the emergency department should there be any acute change or worsening of their condition.  I have explained my findings and the patient has expressed understanding to me.  I explained that the work-up performed in the ED has been based on the specific complaint and concern, as the nature of emergency medicine is complaint driven and they understand that new symptoms may arise.  I have told them that, should there be any new symptoms, worsening or changing symptoms, a new work-up may be indicated that they are encouraged to return to the emergency department or promptly contact their primary care physician. We have employed a shared decision-making process as the discussion of their disposition.  The patient has been educated as to the nature of the visit, the tests and work-up performed and the findings from today's visit. At this time, there does not appear to be any acute emergent process that necessitates admission to the hospital, however, the patient understands that this can change unexpectedly. At this time, the patient is stable for discharge home and agrees to follow-up with her primary care physician in the next 24 to 48 hours or earlier should they be able to obtain an appointment.    The patient was counseled regarding diagnostic results and treatment plan and patient has indicated understanding of these instructions.      Problems Addressed:  Syncope, unspecified syncope type: complicated  acute illness or injury    Amount and/or Complexity of Data Reviewed  Labs: ordered. Decision-making details documented in ED Course.  Radiology: ordered. Decision-making details documented in ED Course.  ECG/medicine tests: ordered. Decision-making details documented in ED Course.    Risk  Prescription drug management.        Final diagnoses:   Syncope, unspecified syncope type       ED Disposition  ED Disposition       ED Disposition   Discharge    Condition   Stable    Comment   --               Lelo Sanchez, APRN  [address] [address]  [phone]    Schedule an appointment as soon as possible for a visit in 1 day  For further evaluation         Medication List      No changes were made to your prescriptions during this visit.            Arturo Smith, APRN  06/25/25 [id number]

## 2025-06-26 LAB
QT INTERVAL: 370 MS
QTC INTERVAL: 445 MS

## 2025-06-26 RX ORDER — ROPINIROLE 0.25 MG/1
0.25 TABLET, FILM COATED ORAL NIGHTLY
Qty: 90 TABLET | Refills: 3 | OUTPATIENT
Start: 2025-06-26

## 2025-06-26 RX ORDER — PREGABALIN 150 MG/1
150 CAPSULE ORAL 2 TIMES DAILY
Qty: 60 CAPSULE | Refills: 5 | OUTPATIENT
Start: 2025-06-26 | End: 2025-12-23

## 2025-06-26 RX ORDER — LISINOPRIL AND HYDROCHLOROTHIAZIDE 20; 25 MG/1; MG/1
1 TABLET ORAL DAILY
Qty: 90 TABLET | Refills: 1 | OUTPATIENT
Start: 2025-06-26

## 2025-06-26 RX ORDER — VALACYCLOVIR HYDROCHLORIDE 1 G/1
TABLET, FILM COATED ORAL
Qty: 30 TABLET | Refills: 5 | OUTPATIENT
Start: 2025-06-26

## 2025-06-26 RX ORDER — PHENTERMINE HYDROCHLORIDE 37.5 MG/1
37.5 TABLET ORAL
Qty: 30 TABLET | Refills: 5 | OUTPATIENT
Start: 2025-06-26 | End: 2025-12-23

## 2025-07-07 DIAGNOSIS — F41.9 ANXIETY: Primary | ICD-10-CM

## 2025-07-07 RX ORDER — LORAZEPAM 0.5 MG/1
TABLET ORAL
Qty: 2 TABLET | Refills: 0 | Status: SHIPPED | OUTPATIENT
Start: 2025-07-07 | End: 2025-07-31

## 2025-07-08 ENCOUNTER — HOSPITAL ENCOUNTER (OUTPATIENT)
Facility: HOSPITAL | Age: 50
Discharge: HOME | End: 2025-07-11
Payer: MEDICAID

## 2025-07-08 DIAGNOSIS — R41.3 MEMORY DISTURBANCE: ICD-10-CM

## 2025-07-08 DIAGNOSIS — R20.2 PARESTHESIA: ICD-10-CM

## 2025-07-08 PROCEDURE — 95714 VEEG EA 12-26 HR UNMNTR: CPT

## 2025-07-08 PROCEDURE — A9579 GAD-BASE MR CONTRAST NOS,1ML: HCPCS | Performed by: PSYCHIATRY & NEUROLOGY

## 2025-07-08 PROCEDURE — 95700 EEG CONT REC W/VID EEG TECH: CPT

## 2025-07-08 PROCEDURE — 70553 MRI BRAIN STEM W/O & W/DYE: CPT

## 2025-07-08 PROCEDURE — 95719 EEG PHYS/QHP EA INCR W/O VID: CPT | Performed by: PSYCHIATRY & NEUROLOGY

## 2025-07-08 PROCEDURE — 6360000004 HC RX CONTRAST MEDICATION: Performed by: PSYCHIATRY & NEUROLOGY

## 2025-07-08 RX ORDER — GADOTERIDOL 279.3 MG/ML
20 INJECTION INTRAVENOUS
Status: COMPLETED | OUTPATIENT
Start: 2025-07-08 | End: 2025-07-08

## 2025-07-08 RX ADMIN — GADOTERIDOL 20 ML: 279.3 INJECTION, SOLUTION INTRAVENOUS at 11:06

## 2025-07-10 PROBLEM — R41.82 ACUTE ALTERATION IN MENTAL STATUS: Status: ACTIVE | Noted: 2025-07-10

## 2025-07-10 PROBLEM — R56.9 CONVULSIONS (HCC): Status: ACTIVE | Noted: 2025-07-10

## 2025-07-11 NOTE — PROCEDURES
[address]  [address]                                   EEG      PATIENT NAME: JACY LINN             : 1975  MED REC NO: [id number]                       ROOM:   ACCOUNT NO: [id number]                       ADMIT DATE: 2025  PROVIDER: Teo Carranza MD    DATE OF SERVICE:  2025    REFERRING PHYSICIAN:  JOSE DE JESUS RICK    TYPE OF STUDY:  24-hour ambulatory EEG recording.    DATE OF STUDY:  2025 to 2025.    CLINICAL INDICATION:  The patient is a 49-year-old female with a history of altered mental status with spells of sudden confusion, EEG to rule out seizures, rule out cortical abnormality, rule out epilepsy.    EEG CLASSIFICATION:  The patient EEG is essentially normal ambulatory 24-hour EEG recording.    DESCRIPTION OF THE RECORD:  This is a 16-channel EEG recording on the patient to rule out seizures.  This study began on 2027, at approximately 12:27 p.m., and ended at 2027 at approximately 12:21 p.m., for a total time of study of 23 hours and 54 minutes.  During this time, the patient had some movement muscle and electrode artifact seen in the recording.  The patient, however, did have a study that was interpretable and of adequate quality.  During her study, the patient did have a posteriorly located occipital alpha rhythm of 8-9 hertz that did attenuate with eye opening.  During her study, the patient had no clear areas of focal slowing, no clear spike or spike-and-wave discharges, and no recorded electrographic or dysrhythmic spells of any type seen.  Hyperventilation was not performed.  Photic stimulation was not performed.  The patient did enter prolonged states of sleep in the evening hours with K complexes and sleep spindles seen in central head regions.  On the patient's clinical diary during this recording, the patient recorded some headaches and some normal daily living

## 2025-07-15 DIAGNOSIS — D32.9 MENINGIOMA (HCC): Primary | ICD-10-CM

## 2025-07-16 ENCOUNTER — CLINICAL DOCUMENTATION (OUTPATIENT)
Age: 50
End: 2025-07-16

## 2025-07-16 NOTE — PROGRESS NOTES
Faxed neurosurgery referral to [facility] at [phone]  Faxed with last office note (05/19/25), MRI brain results (07/12/25)  Received fax confirmation.   Sent confirmation sheet to  for scanning.

## 2025-07-16 NOTE — PROGRESS NOTES
Faxed endocrinology referral to [facility] [phone]  Received fax confirmation.   Sent confirmation sheet to  for scanning.

## 2025-07-23 ENCOUNTER — TELEPHONE (OUTPATIENT)
Age: 50
End: 2025-07-23

## 2025-07-23 NOTE — TELEPHONE ENCOUNTER
Patient calling [facility]. She states she messaged An[facility]a Hurst [facility] yesterday and it showed she had Convulsions (HCC) and Acute Alteration in Mental Status.     She asked Anitha Hurst for clarity on this and was told this can wait until her next appointment. Patient is distraught and states she cannot wait until her next appointment. She does not appreciate how this was handled at all. I voiced concern for her situation and advised that I would pass this along to the  [facility], but this may take 2-3 business days for a return phone call. Patient verbalized understanding.    She states she thought the EEG was to rule out seizures and if the EEG was normal, why are there convulsions and acute alteration in mental status? She is asking to speak with someone right away about this.     Please call her back at .  Thank you.

## 2025-07-24 NOTE — TELEPHONE ENCOUNTER
I spoke with Mrs. Villasenor.  I explained I could not see where Candelario had these diagnoses on her records.  I communicated it could be associated with her EEG as a \"rule out\" scenario.  I recommended she discuss with Anabella at her visit on 8/1/25.  Patient appreciated the call and will discuss with Anabella.

## 2025-08-01 ENCOUNTER — PROCEDURE VISIT (OUTPATIENT)
Age: 50
End: 2025-08-01
Payer: MEDICAID

## 2025-08-01 DIAGNOSIS — G62.9 PERIPHERAL POLYNEUROPATHY: Primary | ICD-10-CM

## 2025-08-01 PROCEDURE — 11105 PUNCH BX SKIN EA SEP/ADDL: CPT | Performed by: PSYCHIATRY & NEUROLOGY

## 2025-08-01 PROCEDURE — 11104 PUNCH BX SKIN SINGLE LESION: CPT | Performed by: PSYCHIATRY & NEUROLOGY

## 2025-08-01 NOTE — PROGRESS NOTES
[facility]  Neurology Clinic  [facility]    OFFICE PROCEDURE NOTE: Skin Punch Biopsy          PERFORMING PROVIDER: RIA De Guzman   PROCEDURE:  Skin Punch Biopsy    CPT Code: 17321, 11105 X 2    ICD-10 Code: Polyneuropathy, unspecified  G62.9     Medications/ Supplies:   Skin-punch biopsy kit from [facility] including: skin punch biopsy tool, povidone/ iodine prep pad, alcohol prep pad   [] 0.5% [x]1% Lidocaine  [] bupivacaine   []with epinephrine  3 mL syringe, 30G 1/2\" needle    The procedure and potential complications were explained to the patient, and verbal consent was obtained.  The skin was cleansed with the betadine swabs over the:    [] N/A []Right [] Left (foot)  [] N/A []Right [x]Left Calf (10 centimeters proximal to the lat malleolus)  [] N/A []Right [x]Left wrist - 5 cm proximal to the wrist  [] N/A []Right [x]Left thigh-lateral thigh 20 cm below the iliac spine at the level of the pubis  [] N/A []Right []Left  proximal arm: lateral surface midway between shoulder and elbow        The perimeter of the cleansed areas was infiltrated with lidocaine solution.  A skin punch biopsy was performed at the distal site with the provided biopsy tool, and the biopsy was placed into container labeled distal sample.  The process was repeated at the proximal site and the biopsy was placed into the container labeled proximal sample.  The containers were appropriately labeled with the patient names, lids tightened and placed on ice in the supplied styrofoam box and sent by [facility] to the lab.    The biopsy sites were cleaned with alcohol swabs x 2 then bandages applied.      There were no immediate or obvious complications and the patient did not complain of pain afterwards.         ___________________  RIA De Guzman

## 2025-08-01 NOTE — PATIENT INSTRUCTIONS
Monday through Friday or on weekends    Form/Paperwork Completion:  We ask that you allow 7-14 business days.  Forms can be downloaded to [employer] or you can have them faxed, mailed, or you can bring them in to our office directly.

## 2025-08-06 ENCOUNTER — PATIENT MESSAGE (OUTPATIENT)
Age: 50
End: 2025-08-06

## 2025-08-06 ENCOUNTER — OFFICE VISIT (OUTPATIENT)
Age: 50
End: 2025-08-06
Payer: MEDICAID

## 2025-08-06 VITALS
BODY MASS INDEX: 30.94 KG/M2 | TEMPERATURE: 97.7 F | HEART RATE: 55 BPM | SYSTOLIC BLOOD PRESSURE: 154 MMHG | DIASTOLIC BLOOD PRESSURE: 97 MMHG | WEIGHT: 209.6 LBS | OXYGEN SATURATION: 98 %

## 2025-08-06 DIAGNOSIS — F31.9 BIPOLAR 1 DISORDER (HCC): ICD-10-CM

## 2025-08-06 DIAGNOSIS — F60.3 BORDERLINE PERSONALITY DISORDER (HCC): ICD-10-CM

## 2025-08-06 DIAGNOSIS — I10 PRIMARY HYPERTENSION: ICD-10-CM

## 2025-08-06 DIAGNOSIS — D35.2 PITUITARY ADENOMA (HCC): ICD-10-CM

## 2025-08-06 DIAGNOSIS — R73.03 PREDIABETES: ICD-10-CM

## 2025-08-06 DIAGNOSIS — F41.9 ANXIETY: ICD-10-CM

## 2025-08-06 DIAGNOSIS — D35.2 PITUITARY ADENOMA (HCC): Primary | ICD-10-CM

## 2025-08-06 DIAGNOSIS — E66.09 CLASS 1 OBESITY DUE TO EXCESS CALORIES WITH SERIOUS COMORBIDITY AND BODY MASS INDEX (BMI) OF 30.0 TO 30.9 IN ADULT: Primary | ICD-10-CM

## 2025-08-06 DIAGNOSIS — F90.9 ATTENTION DEFICIT HYPERACTIVITY DISORDER (ADHD), UNSPECIFIED ADHD TYPE: ICD-10-CM

## 2025-08-06 DIAGNOSIS — E66.811 CLASS 1 OBESITY DUE TO EXCESS CALORIES WITH SERIOUS COMORBIDITY AND BODY MASS INDEX (BMI) OF 30.0 TO 30.9 IN ADULT: Primary | ICD-10-CM

## 2025-08-06 DIAGNOSIS — F43.10 POSTTRAUMATIC STRESS DISORDER: ICD-10-CM

## 2025-08-06 DIAGNOSIS — G47.33 OSA (OBSTRUCTIVE SLEEP APNEA): ICD-10-CM

## 2025-08-06 DIAGNOSIS — D32.9 MENINGIOMA (HCC): ICD-10-CM

## 2025-08-06 DIAGNOSIS — E04.1 THYROID NODULE: ICD-10-CM

## 2025-08-06 DIAGNOSIS — E78.1 HYPERTRIGLYCERIDEMIA: ICD-10-CM

## 2025-08-06 DIAGNOSIS — M79.7 FIBROMYALGIA: ICD-10-CM

## 2025-08-06 PROBLEM — E05.20 TOXIC MULTINODULAR GOITER: Status: RESOLVED | Noted: 2025-08-06 | Resolved: 2025-08-06

## 2025-08-06 PROCEDURE — 3080F DIAST BP >= 90 MM HG: CPT | Performed by: STUDENT IN AN ORGANIZED HEALTH CARE EDUCATION/TRAINING PROGRAM

## 2025-08-06 PROCEDURE — 99215 OFFICE O/P EST HI 40 MIN: CPT | Performed by: STUDENT IN AN ORGANIZED HEALTH CARE EDUCATION/TRAINING PROGRAM

## 2025-08-06 PROCEDURE — 3077F SYST BP >= 140 MM HG: CPT | Performed by: STUDENT IN AN ORGANIZED HEALTH CARE EDUCATION/TRAINING PROGRAM

## 2025-08-06 RX ORDER — LISINOPRIL 20 MG/1
20 TABLET ORAL NIGHTLY
Qty: 90 TABLET | Refills: 1 | Status: SHIPPED | OUTPATIENT
Start: 2025-08-06

## 2025-08-06 ASSESSMENT — PATIENT HEALTH QUESTIONNAIRE - PHQ9
2. FEELING DOWN, DEPRESSED OR HOPELESS: NOT AT ALL
SUM OF ALL RESPONSES TO PHQ QUESTIONS 1-9: 0
SUM OF ALL RESPONSES TO PHQ QUESTIONS 1-9: 0
1. LITTLE INTEREST OR PLEASURE IN DOING THINGS: NOT AT ALL
SUM OF ALL RESPONSES TO PHQ QUESTIONS 1-9: 0
SUM OF ALL RESPONSES TO PHQ QUESTIONS 1-9: 0

## 2025-08-07 DIAGNOSIS — I10 PRIMARY HYPERTENSION: ICD-10-CM

## 2025-08-07 RX ORDER — LISINOPRIL AND HYDROCHLOROTHIAZIDE 20; 25 MG/1; MG/1
1 TABLET ORAL DAILY
Qty: 90 TABLET | Refills: 1 | Status: SHIPPED | OUTPATIENT
Start: 2025-08-07

## 2025-08-12 ENCOUNTER — PROCEDURE VISIT (OUTPATIENT)
Age: 50
End: 2025-08-12
Payer: MEDICAID

## 2025-08-12 DIAGNOSIS — Z81.8 FAMILY HISTORY OF BIPOLAR DISORDER: ICD-10-CM

## 2025-08-12 DIAGNOSIS — Z86.59 HISTORY OF PSYCHIATRIC HOSPITALIZATION: ICD-10-CM

## 2025-08-12 DIAGNOSIS — F43.10 POST-TRAUMATIC STRESS: ICD-10-CM

## 2025-08-12 DIAGNOSIS — F34.1 DYSTHYMIA: ICD-10-CM

## 2025-08-12 DIAGNOSIS — Z86.59 HISTORY OF PANIC ATTACKS: ICD-10-CM

## 2025-08-12 DIAGNOSIS — Z86.59 HISTORY OF BORDERLINE PERSONALITY DISORDER: ICD-10-CM

## 2025-08-12 DIAGNOSIS — Z62.9 HISTORY OF ADVERSE CHILDHOOD EXPERIENCES: ICD-10-CM

## 2025-08-12 DIAGNOSIS — F90.0 ATTENTION DEFICIT HYPERACTIVITY DISORDER (ADHD), PREDOMINANTLY INATTENTIVE TYPE: Primary | ICD-10-CM

## 2025-08-12 PROCEDURE — 96139 PSYCL/NRPSYC TST TECH EA: CPT | Performed by: PSYCHOLOGIST

## 2025-08-12 PROCEDURE — 96138 PSYCL/NRPSYC TECH 1ST: CPT | Performed by: PSYCHOLOGIST

## 2025-08-12 PROCEDURE — 96136 PSYCL/NRPSYC TST PHY/QHP 1ST: CPT | Performed by: PSYCHOLOGIST

## 2025-08-12 PROCEDURE — 96132 NRPSYC TST EVAL PHYS/QHP 1ST: CPT | Performed by: PSYCHOLOGIST

## 2025-08-12 PROCEDURE — 96133 NRPSYC TST EVAL PHYS/QHP EA: CPT | Performed by: PSYCHOLOGIST

## 2025-08-12 SDOH — HEALTH STABILITY - MENTAL HEALTH: PROBLEM RELATED TO UPBRINGING, UNSPECIFIED: Z62.9

## 2025-08-14 ENCOUNTER — TELEPHONE (OUTPATIENT)
Age: 50
End: 2025-08-14

## 2025-08-14 DIAGNOSIS — E66.09 CLASS 1 OBESITY DUE TO EXCESS CALORIES WITH SERIOUS COMORBIDITY AND BODY MASS INDEX (BMI) OF 30.0 TO 30.9 IN ADULT: Primary | ICD-10-CM

## 2025-08-14 DIAGNOSIS — E66.811 CLASS 1 OBESITY DUE TO EXCESS CALORIES WITH SERIOUS COMORBIDITY AND BODY MASS INDEX (BMI) OF 30.0 TO 30.9 IN ADULT: Primary | ICD-10-CM

## 2025-08-14 RX ORDER — ORLISTAT 120 MG/1
120 CAPSULE ORAL
Qty: 90 CAPSULE | Refills: 5 | Status: SHIPPED | OUTPATIENT
Start: 2025-08-14

## 2025-08-22 ENCOUNTER — HOSPITAL ENCOUNTER (OUTPATIENT)
Facility: HOSPITAL | Age: 50
Discharge: HOME | End: 2025-08-25

## 2025-08-22 DIAGNOSIS — D35.2 PITUITARY ADENOMA (HCC): ICD-10-CM

## 2025-08-22 DIAGNOSIS — I10 PRIMARY HYPERTENSION: ICD-10-CM

## 2025-08-22 DIAGNOSIS — R73.03 PREDIABETES: ICD-10-CM

## 2025-08-22 LAB
COMMENT:: NORMAL
SPECIMEN HOLD: NORMAL

## 2025-08-24 LAB
ANION GAP SERPL CALC-SCNC: 10 MMOL/L (ref 2–14)
BUN SERPL-MCNC: 17 MG/DL (ref 6–20)
BUN/CREAT SERPL: 23 (ref 12–20)
CALCIUM SERPL-MCNC: 9.1 MG/DL (ref 8.6–10)
CHLORIDE SERPL-SCNC: 105 MMOL/L (ref 98–107)
CO2 SERPL-SCNC: 24 MMOL/L (ref 20–29)
CORTIS AM PEAK SERPL-MCNC: 12.4 UG/DL (ref 4.8–19.5)
CREAT SERPL-MCNC: 0.76 MG/DL (ref 0.6–1)
EST. AVERAGE GLUCOSE BLD GHB EST-MCNC: 107 MG/DL
ESTRADIOL SERPL-MCNC: 102 PG/ML
FSH SERPL-ACNC: 10.2 MIU/ML
GLUCOSE SERPL-MCNC: 82 MG/DL (ref 65–100)
HBA1C MFR BLD: 5.4 % (ref 4–5.6)
LH SERPL-ACNC: 14.4 MIU/ML
POTASSIUM SERPL-SCNC: 4.1 MMOL/L (ref 3.5–5.1)
PROLACTIN SERPL-MCNC: 21 NG/ML (ref 4.8–23.3)
SODIUM SERPL-SCNC: 139 MMOL/L (ref 136–145)
T4 FREE SERPL-MCNC: 0.9 NG/DL (ref 0.9–1.6)
TSH, 3RD GENERATION: 0.3 UIU/ML (ref 0.27–4.2)

## 2025-08-25 LAB — ACTH PLAS-MCNC: 52.6 PG/ML (ref 7.2–63.3)

## 2025-08-26 LAB — IGF-I SERPL-MCNC: 91 NG/ML (ref 70–225)

## 2025-08-28 ENCOUNTER — TELEPHONE (OUTPATIENT)
Age: 50
End: 2025-08-28

## 2025-09-02 ENCOUNTER — OFFICE VISIT (OUTPATIENT)
Age: 50
End: 2025-09-02
Payer: MEDICAID

## 2025-09-02 VITALS
SYSTOLIC BLOOD PRESSURE: 122 MMHG | BODY MASS INDEX: 30.75 KG/M2 | DIASTOLIC BLOOD PRESSURE: 70 MMHG | HEART RATE: 52 BPM | WEIGHT: 207.6 LBS | OXYGEN SATURATION: 99 % | HEIGHT: 69 IN

## 2025-09-02 DIAGNOSIS — D35.2 PITUITARY MICROADENOMA (HCC): Primary | ICD-10-CM

## 2025-09-02 DIAGNOSIS — E05.20 TOXIC MULTINODULAR GOITER: ICD-10-CM

## 2025-09-02 PROCEDURE — 3074F SYST BP LT 130 MM HG: CPT | Performed by: INTERNAL MEDICINE

## 2025-09-02 PROCEDURE — 99204 OFFICE O/P NEW MOD 45 MIN: CPT | Performed by: INTERNAL MEDICINE

## 2025-09-02 PROCEDURE — 3078F DIAST BP <80 MM HG: CPT | Performed by: INTERNAL MEDICINE

## 2025-09-02 ASSESSMENT — LIFESTYLE VARIABLES
HOW MANY STANDARD DRINKS CONTAINING ALCOHOL DO YOU HAVE ON A TYPICAL DAY: PATIENT DOES NOT DRINK
HOW OFTEN DO YOU HAVE A DRINK CONTAINING ALCOHOL: MONTHLY OR LESS

## 2025-09-04 ENCOUNTER — TELEMEDICINE (OUTPATIENT)
Age: 50
End: 2025-09-04

## 2025-09-04 DIAGNOSIS — F34.1 DYSTHYMIA: ICD-10-CM

## 2025-09-04 DIAGNOSIS — Z81.8 FAMILY HISTORY OF BIPOLAR DISORDER: ICD-10-CM

## 2025-09-04 DIAGNOSIS — Z86.59 HISTORY OF BORDERLINE PERSONALITY DISORDER: ICD-10-CM

## 2025-09-04 DIAGNOSIS — Z86.59 HISTORY OF PSYCHIATRIC HOSPITALIZATION: ICD-10-CM

## 2025-09-04 DIAGNOSIS — F43.10 POST-TRAUMATIC STRESS: ICD-10-CM

## 2025-09-04 DIAGNOSIS — Z86.59 HISTORY OF PANIC ATTACKS: ICD-10-CM

## 2025-09-04 DIAGNOSIS — Z62.9 HISTORY OF ADVERSE CHILDHOOD EXPERIENCES: ICD-10-CM

## 2025-09-04 DIAGNOSIS — F90.0 ATTENTION DEFICIT HYPERACTIVITY DISORDER (ADHD), PREDOMINANTLY INATTENTIVE TYPE: Primary | ICD-10-CM

## 2025-09-04 SDOH — HEALTH STABILITY - MENTAL HEALTH: PROBLEM RELATED TO UPBRINGING, UNSPECIFIED: Z62.9

## (undated) DEVICE — ESOPHAGEAL BALLOON DILATATION CATHETER: Brand: CRE FIXED WIRE

## (undated) DEVICE — FIBR LASR FLEXIVAPULSE365 HOLMIUM FLT/TP 1P/U

## (undated) DEVICE — TUBING, SUCTION, 1/4" X 20', STRAIGHT: Brand: MEDLINE INDUSTRIES, INC.

## (undated) DEVICE — THE BITE BLOCK MAXI, LATEX FREE STRAP IS USED TO PROTECT THE ENDOSCOPE INSERTION TUBE FROM BEING BITTEN BY THE PATIENT.

## (undated) DEVICE — SINGLE PORT MANIFOLD: Brand: NEPTUNE 2

## (undated) DEVICE — 4-PORT MANIFOLD: Brand: NEPTUNE 2

## (undated) DEVICE — SYR LUERLOK 30CC

## (undated) DEVICE — DEV INFL ALLIANCE2 SYS

## (undated) DEVICE — Device: Brand: DEFENDO AIR/WATER/SUCTION AND BIOPSY VALVE

## (undated) DEVICE — Device

## (undated) DEVICE — TUBING, SUCTION, 3/16" X 10', STRAIGHT: Brand: MEDLINE

## (undated) DEVICE — COR CYSTO: Brand: MEDLINE INDUSTRIES, INC.

## (undated) DEVICE — NITINOL STONE RETRIEVAL BASKET: Brand: ZERO TIP

## (undated) DEVICE — GOWN,REINF,POLY,ECL,PP SLV,XL: Brand: MEDLINE

## (undated) DEVICE — FRCP BX RADJAW4 NDL 2.8 240CM LG OG BX40